# Patient Record
Sex: MALE | Race: WHITE | Employment: FULL TIME | URBAN - METROPOLITAN AREA
[De-identification: names, ages, dates, MRNs, and addresses within clinical notes are randomized per-mention and may not be internally consistent; named-entity substitution may affect disease eponyms.]

---

## 2017-11-02 ENCOUNTER — INITIAL CONSULT (OUTPATIENT)
Dept: SURGERY | Age: 47
End: 2017-11-02

## 2017-11-02 VITALS
RESPIRATION RATE: 12 BRPM | DIASTOLIC BLOOD PRESSURE: 64 MMHG | SYSTOLIC BLOOD PRESSURE: 118 MMHG | BODY MASS INDEX: 32.1 KG/M2 | HEART RATE: 72 BPM | TEMPERATURE: 98.3 F | WEIGHT: 237 LBS | HEIGHT: 72 IN

## 2017-11-02 DIAGNOSIS — Z93.3 STATUS POST HARTMANN PROCEDURE (HCC): Primary | ICD-10-CM

## 2017-11-02 DIAGNOSIS — K43.2 INCISIONAL HERNIA, WITHOUT OBSTRUCTION OR GANGRENE: ICD-10-CM

## 2017-11-02 PROCEDURE — 99213 OFFICE O/P EST LOW 20 MIN: CPT | Performed by: SURGERY

## 2017-11-02 ASSESSMENT — ENCOUNTER SYMPTOMS
ANAL BLEEDING: 0
TROUBLE SWALLOWING: 0
VOMITING: 0
BACK PAIN: 0
WHEEZING: 0
EYE PAIN: 0
EYE DISCHARGE: 0
ABDOMINAL DISTENTION: 0
CHOKING: 0
COLOR CHANGE: 0
ABDOMINAL PAIN: 0
SHORTNESS OF BREATH: 0
CHEST TIGHTNESS: 0

## 2017-11-02 NOTE — PROGRESS NOTES
daily      aspirin 81 MG tablet Take 81 mg by mouth daily      oxyCODONE-acetaminophen (PERCOCET) 5-325 MG per tablet Take 1 tablet by mouth every 4 hours as needed for Pain       No current facility-administered medications for this visit. Allergies   Allergen Reactions    Penicillins      swelling         Review of Systems   Constitutional: Negative for chills, fatigue, fever and unexpected weight change. HENT: Negative for nosebleeds and trouble swallowing. Eyes: Negative for pain and discharge. Respiratory: Negative for choking, chest tightness, shortness of breath and wheezing. Cardiovascular: Negative for chest pain, palpitations and leg swelling. Gastrointestinal: Negative for abdominal distention, abdominal pain, anal bleeding and vomiting. Genitourinary: Negative for difficulty urinating, dysuria, flank pain and urgency. Musculoskeletal: Negative for arthralgias, back pain and neck stiffness. Skin: Negative for color change and wound. Neurological: Negative for dizziness, tremors, seizures, syncope and headaches. Hematological: Negative for adenopathy. Does not bruise/bleed easily. Psychiatric/Behavioral: Negative for agitation, behavioral problems and confusion. The patient is not nervous/anxious. Vitals:    11/02/17 1454   BP: 118/64   Pulse: 72   Resp: 12   Temp: 98.3 °F (36.8 °C)           Physical Exam   Constitutional: He is oriented to person, place, and time. He appears well-developed and well-nourished. HENT:   Head: Normocephalic and atraumatic. Eyes: Conjunctivae are normal. Pupils are equal, round, and reactive to light. Neck: Normal range of motion. Cardiovascular: Normal rate and normal heart sounds. Pulmonary/Chest: Effort normal. No stridor. He has no wheezes. He has no rales. Abdominal: Soft. Bowel sounds are normal. He exhibits no mass. There is no tenderness. There is no rebound and no guarding.    Midline scar with multiple

## 2023-01-18 PROBLEM — G89.18 POST-OP PAIN: Status: ACTIVE | Noted: 2023-01-18

## 2023-01-18 PROBLEM — L50.9 URTICARIA: Status: ACTIVE | Noted: 2023-01-18

## 2023-01-18 PROBLEM — K57.20 DIVERTICULITIS OF COLON WITH PERFORATION: Status: ACTIVE | Noted: 2023-01-18

## 2023-01-18 PROBLEM — R05.8 PRODUCTIVE COUGH: Status: ACTIVE | Noted: 2023-01-18

## 2023-01-18 PROBLEM — N17.9 AKI (ACUTE KIDNEY INJURY) (CMS-HCC): Status: ACTIVE | Noted: 2023-01-18

## 2023-01-18 PROBLEM — Z79.4 TYPE 2 DIABETES MELLITUS WITHOUT COMPLICATION, WITH LONG-TERM CURRENT USE OF INSULIN (MULTI): Status: ACTIVE | Noted: 2023-01-18

## 2023-01-18 PROBLEM — H81.23 VESTIBULAR NEURONITIS OF BOTH EARS: Status: ACTIVE | Noted: 2023-01-18

## 2023-01-18 PROBLEM — E03.9 ACQUIRED HYPOTHYROIDISM: Status: ACTIVE | Noted: 2023-01-18

## 2023-01-18 PROBLEM — N20.1 LEFT URETERAL STONE: Status: ACTIVE | Noted: 2023-01-18

## 2023-01-18 PROBLEM — I10 ESSENTIAL HYPERTENSION: Status: ACTIVE | Noted: 2023-01-18

## 2023-01-18 PROBLEM — N13.30 HYDRONEPHROSIS: Status: ACTIVE | Noted: 2023-01-18

## 2023-01-18 PROBLEM — S37.10XA URETER INJURY: Status: ACTIVE | Noted: 2023-01-18

## 2023-01-18 PROBLEM — H66.90 ACUTE OTITIS MEDIA: Status: ACTIVE | Noted: 2023-01-18

## 2023-01-18 PROBLEM — E11.9 TYPE 2 DIABETES MELLITUS WITHOUT COMPLICATION, WITH LONG-TERM CURRENT USE OF INSULIN (MULTI): Status: ACTIVE | Noted: 2023-01-18

## 2023-01-18 PROBLEM — G47.33 OBSTRUCTIVE SLEEP APNEA ON CPAP: Status: ACTIVE | Noted: 2023-01-18

## 2023-01-18 PROBLEM — E78.2 MIXED HYPERLIPIDEMIA: Status: ACTIVE | Noted: 2023-01-18

## 2023-01-18 PROBLEM — J06.9 ACUTE URI: Status: ACTIVE | Noted: 2023-01-18

## 2023-01-18 PROBLEM — T81.40XA POST OP INFECTION: Status: ACTIVE | Noted: 2023-01-18

## 2023-01-18 PROBLEM — R10.9 LEFT FLANK PAIN: Status: ACTIVE | Noted: 2023-01-18

## 2023-01-18 PROBLEM — R07.89 OTHER CHEST PAIN: Status: ACTIVE | Noted: 2023-01-18

## 2023-01-18 PROBLEM — K43.9 VENTRAL HERNIA: Status: ACTIVE | Noted: 2023-01-18

## 2023-01-18 PROBLEM — R00.2 PALPITATIONS: Status: ACTIVE | Noted: 2023-01-18

## 2023-01-18 PROBLEM — K43.2 INCISIONAL HERNIA: Status: ACTIVE | Noted: 2023-01-18

## 2023-01-18 PROBLEM — H65.01 RIGHT ACUTE SEROUS OTITIS MEDIA: Status: ACTIVE | Noted: 2023-01-18

## 2023-01-18 RX ORDER — DULAGLUTIDE 1.5 MG/.5ML
1.5 INJECTION, SOLUTION SUBCUTANEOUS
COMMUNITY
End: 2023-03-06 | Stop reason: DRUGHIGH

## 2023-01-18 RX ORDER — ATORVASTATIN CALCIUM 20 MG/1
20 TABLET, FILM COATED ORAL DAILY
COMMUNITY
End: 2023-06-02 | Stop reason: SDUPTHER

## 2023-01-18 RX ORDER — CETIRIZINE HYDROCHLORIDE 10 MG/1
10 TABLET ORAL DAILY
COMMUNITY
End: 2023-03-06 | Stop reason: ALTCHOICE

## 2023-01-18 RX ORDER — LANCETS 33 GAUGE
EACH MISCELLANEOUS
COMMUNITY

## 2023-01-18 RX ORDER — METFORMIN HYDROCHLORIDE 1000 MG/1
1000 TABLET ORAL
COMMUNITY
End: 2023-08-22 | Stop reason: SDUPTHER

## 2023-01-18 RX ORDER — LEVOTHYROXINE SODIUM 150 UG/1
150 TABLET ORAL DAILY
COMMUNITY
End: 2023-06-02 | Stop reason: SDUPTHER

## 2023-01-18 RX ORDER — BLOOD SUGAR DIAGNOSTIC
STRIP MISCELLANEOUS
COMMUNITY

## 2023-01-18 RX ORDER — LISINOPRIL 20 MG/1
20 TABLET ORAL DAILY
COMMUNITY
End: 2023-06-02 | Stop reason: SDUPTHER

## 2023-03-04 ASSESSMENT — ENCOUNTER SYMPTOMS
NERVOUS/ANXIOUS: 0
HUNGER: 0
WEAKNESS: 0
WEIGHT LOSS: 0
DIZZINESS: 0
HEADACHES: 0
TREMORS: 0
BLURRED VISION: 0
SWEATS: 0
POLYPHAGIA: 0
VISUAL CHANGE: 0
SPEECH DIFFICULTY: 0
FATIGUE: 0
CONFUSION: 0
POLYDIPSIA: 0
SEIZURES: 0
BLACKOUTS: 0

## 2023-03-06 ENCOUNTER — OFFICE VISIT (OUTPATIENT)
Dept: PRIMARY CARE | Facility: CLINIC | Age: 53
End: 2023-03-06
Payer: COMMERCIAL

## 2023-03-06 VITALS
BODY MASS INDEX: 31.15 KG/M2 | HEART RATE: 64 BPM | OXYGEN SATURATION: 97 % | RESPIRATION RATE: 16 BRPM | DIASTOLIC BLOOD PRESSURE: 74 MMHG | SYSTOLIC BLOOD PRESSURE: 112 MMHG | WEIGHT: 230 LBS | TEMPERATURE: 97.2 F | HEIGHT: 72 IN

## 2023-03-06 DIAGNOSIS — I10 ESSENTIAL HYPERTENSION: ICD-10-CM

## 2023-03-06 DIAGNOSIS — E78.2 MIXED HYPERLIPIDEMIA: ICD-10-CM

## 2023-03-06 DIAGNOSIS — E03.9 ACQUIRED HYPOTHYROIDISM: ICD-10-CM

## 2023-03-06 DIAGNOSIS — Z12.5 SCREENING FOR PROSTATE CANCER: ICD-10-CM

## 2023-03-06 DIAGNOSIS — Z79.4 TYPE 2 DIABETES MELLITUS WITHOUT COMPLICATION, WITH LONG-TERM CURRENT USE OF INSULIN (MULTI): Primary | ICD-10-CM

## 2023-03-06 DIAGNOSIS — E11.9 TYPE 2 DIABETES MELLITUS WITHOUT COMPLICATION, WITH LONG-TERM CURRENT USE OF INSULIN (MULTI): Primary | ICD-10-CM

## 2023-03-06 PROCEDURE — 3078F DIAST BP <80 MM HG: CPT | Performed by: FAMILY MEDICINE

## 2023-03-06 PROCEDURE — 3074F SYST BP LT 130 MM HG: CPT | Performed by: FAMILY MEDICINE

## 2023-03-06 PROCEDURE — 99214 OFFICE O/P EST MOD 30 MIN: CPT | Performed by: FAMILY MEDICINE

## 2023-03-06 PROCEDURE — 1036F TOBACCO NON-USER: CPT | Performed by: FAMILY MEDICINE

## 2023-03-06 PROCEDURE — 4010F ACE/ARB THERAPY RXD/TAKEN: CPT | Performed by: FAMILY MEDICINE

## 2023-03-06 RX ORDER — DULAGLUTIDE 3 MG/.5ML
3 INJECTION, SOLUTION SUBCUTANEOUS
Qty: 6 ML | Refills: 1 | Status: SHIPPED
Start: 2023-03-06 | End: 2023-08-22 | Stop reason: SDUPTHER

## 2023-03-06 ASSESSMENT — ENCOUNTER SYMPTOMS
HEADACHES: 0
HUNGER: 0
POLYDIPSIA: 0
HYPERTENSION: 1
WEIGHT LOSS: 0
SWEATS: 0
DIZZINESS: 0
TREMORS: 0
BLURRED VISION: 0
POLYPHAGIA: 0
SEIZURES: 0
BLACKOUTS: 0
FATIGUE: 0
NERVOUS/ANXIOUS: 0
VISUAL CHANGE: 0
SPEECH DIFFICULTY: 0
CONFUSION: 0
WEAKNESS: 0

## 2023-03-06 NOTE — PROGRESS NOTES
Subjective   Patient ID: Jesus Cook is a 53 y.o. male who presents for Hyperlipidemia, Hypertension, Hypothyroidism, and Diabetes.    Diabetes  He has type 2 diabetes mellitus. No MedicAlert identification noted. The initial diagnosis of diabetes was made 12 years ago. Pertinent negatives for hypoglycemia include no confusion, dizziness, headaches, hunger, mood changes, nervousness/anxiousness, pallor, seizures, sleepiness, speech difficulty, sweats or tremors. Pertinent negatives for diabetes include no blurred vision, no chest pain, no fatigue, no foot paresthesias, no foot ulcerations, no polydipsia, no polyphagia, no polyuria, no visual change, no weakness and no weight loss. Hypoglycemia complications include nocturnal hypoglycemia. Pertinent negatives for hypoglycemia complications include no blackouts, no hospitalization, no required assistance and no required glucagon injection. Symptoms are stable. Pertinent negatives for diabetic complications include no CVA, heart disease, impotence, nephropathy, peripheral neuropathy, PVD or retinopathy. Risk factors for coronary artery disease include dyslipidemia, family history and hypertension. He is compliant with treatment all of the time. His weight is stable. He is following a generally healthy diet. When asked about meal planning, he reported none. He has not had a previous visit with a dietitian. He participates in exercise daily. He monitors blood glucose at home 1-2 x per day. He monitors urine at home <1 x per month. Blood glucose monitoring compliance is adequate. There is no change in his home blood glucose trend. His breakfast blood glucose is taken between 8-9 am. His breakfast blood glucose range is generally 110-130 mg/dl. His lunch blood glucose is taken between 1-2 pm. His lunch blood glucose range is generally  mg/dl. His dinner blood glucose is taken between 4-5 pm. His dinner blood glucose range is generally  mg/dl. His overall  blood glucose range is 110-130 mg/dl. He does not see a podiatrist.Eye exam is current.   Hyperlipidemia  Pertinent negatives include no chest pain.        Review of Systems   Constitutional:  Negative for fatigue and weight loss.   Eyes:  Negative for blurred vision.   Cardiovascular:  Negative for chest pain.   Endocrine: Negative for polydipsia, polyphagia and polyuria.   Genitourinary:  Negative for impotence.   Skin:  Negative for pallor.   Neurological:  Negative for dizziness, tremors, seizures, speech difficulty, weakness and headaches.   Psychiatric/Behavioral:  Negative for confusion. The patient is not nervous/anxious.        Objective   There were no vitals taken for this visit.    Physical Exam    Assessment/Plan   Problem List Items Addressed This Visit          Circulatory    Essential hypertension    Relevant Orders    Comprehensive Metabolic Panel    Hemoglobin A1C    Lipid Panel    Follow Up In [facility] - PCP       Endocrine/Metabolic    Acquired hypothyroidism    Relevant Orders    Thyroid Stimulating Hormone    Thyroxine, Free    Type 2 diabetes mellitus without complication, with long-term current use of insulin (CMS/[facility]) - Primary    Relevant Medications    dulaglutide (Trulicity) 3 mg/0.5 mL pen injector    Other Relevant Orders    Comprehensive Metabolic Panel    Hemoglobin A1C    Lipid Panel    Follow Up In [facility] - PCP       Other    Mixed hyperlipidemia    Relevant Orders    Comprehensive Metabolic Panel    Hemoglobin A1C    Lipid Panel    Follow Up In [facility] - PCP     Other Visit Diagnoses       Screening for prostate cancer        Relevant Orders    Prostate Spec.Ag,Screen

## 2023-06-02 DIAGNOSIS — E03.9 ACQUIRED HYPOTHYROIDISM: ICD-10-CM

## 2023-06-02 DIAGNOSIS — E78.2 MIXED HYPERLIPIDEMIA: ICD-10-CM

## 2023-06-02 DIAGNOSIS — I10 ESSENTIAL HYPERTENSION: ICD-10-CM

## 2023-06-02 RX ORDER — LISINOPRIL 20 MG/1
TABLET ORAL
Qty: 30 TABLET | Refills: 3 | Status: SHIPPED
Start: 2023-06-02 | End: 2023-08-22 | Stop reason: SDUPTHER

## 2023-06-02 RX ORDER — ATORVASTATIN CALCIUM 20 MG/1
TABLET, FILM COATED ORAL
Qty: 30 TABLET | Refills: 3 | Status: SHIPPED
Start: 2023-06-02 | End: 2023-08-22 | Stop reason: SDUPTHER

## 2023-06-02 RX ORDER — LEVOTHYROXINE SODIUM 150 UG/1
TABLET ORAL
Qty: 30 TABLET | Refills: 3 | Status: SHIPPED
Start: 2023-06-02 | End: 2023-08-22 | Stop reason: SDUPTHER

## 2023-06-02 NOTE — TELEPHONE ENCOUNTER
Rx Refill Request Telephone Encounter    Name:  Jesus Cook  :  [id number]  Medication Name:  levothyroxine (Synthroid, Levoxyl) 150 mcg atorvastatin (Lipitor) 20 mg lisinopril 20 mg   Specific Pharmacy location:  [facility]  Date of last appointment:  3/6  Date of next appointment:  7/10  Best number to reach patient:  [phone]

## 2023-07-10 ENCOUNTER — APPOINTMENT (OUTPATIENT)
Dept: PRIMARY CARE | Facility: CLINIC | Age: 53
End: 2023-07-10
Payer: COMMERCIAL

## 2023-07-31 DIAGNOSIS — R69 TRAVEL-RELATED ILLNESS: ICD-10-CM

## 2023-07-31 RX ORDER — CIPROFLOXACIN 500 MG/1
500 TABLET ORAL 2 TIMES DAILY
Qty: 6 TABLET | Refills: 0 | Status: SHIPPED | OUTPATIENT
Start: 2023-07-31 | End: 2023-08-03

## 2023-07-31 NOTE — TELEPHONE ENCOUNTER
Patient called stating he is going to [place] next week and is was told by the pharmacist that he should try to get a prescription for cipro before he leaves, just incase he gets sick and is unable to get an antibiotic.   Please advise   Grover christian

## 2023-07-31 NOTE — TELEPHONE ENCOUNTER
Date of last appointment:  3/6/2023   Date of next appointment:  8/22/2023   Best number to reach patient:  [phone]       I did pend a 5 day supply of Cipro for your approval or denial.

## 2023-08-19 LAB
ALANINE AMINOTRANSFERASE (SGPT) (U/L) IN SER/PLAS: 16 U/L
ALKALINE PHOSPHATASE (U/L) IN SER/PLAS: 70 U/L
ASPARTATE AMINOTRANSFERASE (SGOT) (U/L) IN SER/PLAS: 15 U/L
BILIRUBIN, TOTAL  (MG/DL) IN SER/PLAS: 1.4 MG/DL (ref 0–1.2)
CHOLESTEROL (MG/DL) IN SER/PLAS: 115 MG/DL
CREATININE (MG/DL) IN SER/PLAS: 0.93 MG/DL
GLUCOSE: 115 MG/DL
HDL-CHOLESTEROL (MG/DL) IN SER/PLAS: 38 MG/DL
HEMOGLOBIN A1C/HEMOGLOBIN TOTAL IN BLOOD: 6.2 %
LDL CHOLESTEROL: 58 MG/DL
POTASSIUM (MMOL/L) IN SER/PLAS: 4.6 MMOL/L
SODIUM (MMOL/L) IN SER/PLAS: 135 MMOL/L
THYROID STIMULATING HORMONE (MIU/L) IN SER/PLAS: 0.99 MIU/L
TRIGLYCERIDES  (MG/DL) IN SER/PLAS: 102 MG/DL
UREA NITROGEN (MG/DL) IN SER/PLAS: 11 MG/DL

## 2023-08-22 ENCOUNTER — OFFICE VISIT (OUTPATIENT)
Dept: PRIMARY CARE | Facility: CLINIC | Age: 53
End: 2023-08-22
Payer: COMMERCIAL

## 2023-08-22 VITALS
DIASTOLIC BLOOD PRESSURE: 70 MMHG | RESPIRATION RATE: 17 BRPM | SYSTOLIC BLOOD PRESSURE: 110 MMHG | HEART RATE: 81 BPM | TEMPERATURE: 97.3 F | OXYGEN SATURATION: 96 % | WEIGHT: 225.4 LBS | HEIGHT: 72 IN | BODY MASS INDEX: 30.53 KG/M2

## 2023-08-22 DIAGNOSIS — Z79.4 TYPE 2 DIABETES MELLITUS WITHOUT COMPLICATION, WITH LONG-TERM CURRENT USE OF INSULIN (MULTI): ICD-10-CM

## 2023-08-22 DIAGNOSIS — E78.2 MIXED HYPERLIPIDEMIA: ICD-10-CM

## 2023-08-22 DIAGNOSIS — E03.9 ACQUIRED HYPOTHYROIDISM: ICD-10-CM

## 2023-08-22 DIAGNOSIS — Z00.00 HEALTHCARE MAINTENANCE: ICD-10-CM

## 2023-08-22 DIAGNOSIS — I10 ESSENTIAL HYPERTENSION: ICD-10-CM

## 2023-08-22 DIAGNOSIS — E11.9 TYPE 2 DIABETES MELLITUS WITHOUT COMPLICATION, WITH LONG-TERM CURRENT USE OF INSULIN (MULTI): ICD-10-CM

## 2023-08-22 PROBLEM — N17.9 AKI (ACUTE KIDNEY INJURY) (CMS-HCC): Status: RESOLVED | Noted: 2023-01-18 | Resolved: 2023-08-22

## 2023-08-22 PROCEDURE — 1036F TOBACCO NON-USER: CPT | Performed by: FAMILY MEDICINE

## 2023-08-22 PROCEDURE — 3044F HG A1C LEVEL LT 7.0%: CPT | Performed by: FAMILY MEDICINE

## 2023-08-22 PROCEDURE — 3074F SYST BP LT 130 MM HG: CPT | Performed by: FAMILY MEDICINE

## 2023-08-22 PROCEDURE — 4010F ACE/ARB THERAPY RXD/TAKEN: CPT | Performed by: FAMILY MEDICINE

## 2023-08-22 PROCEDURE — 3078F DIAST BP <80 MM HG: CPT | Performed by: FAMILY MEDICINE

## 2023-08-22 PROCEDURE — 99396 PREV VISIT EST AGE 40-64: CPT | Performed by: FAMILY MEDICINE

## 2023-08-22 RX ORDER — METFORMIN HYDROCHLORIDE 1000 MG/1
1000 TABLET ORAL
Qty: 180 TABLET | Refills: 1 | Status: SHIPPED | OUTPATIENT
Start: 2023-08-22 | End: 2024-05-23 | Stop reason: SDUPTHER

## 2023-08-22 RX ORDER — LISINOPRIL 20 MG/1
TABLET ORAL
Qty: 90 TABLET | Refills: 1 | Status: SHIPPED | OUTPATIENT
Start: 2023-08-22 | End: 2024-05-23 | Stop reason: SDUPTHER

## 2023-08-22 RX ORDER — DULAGLUTIDE 3 MG/.5ML
3 INJECTION, SOLUTION SUBCUTANEOUS
Qty: 6 ML | Refills: 1 | Status: SHIPPED | OUTPATIENT
Start: 2023-08-22 | End: 2024-01-26

## 2023-08-22 RX ORDER — ATORVASTATIN CALCIUM 20 MG/1
TABLET, FILM COATED ORAL
Qty: 90 TABLET | Refills: 1 | Status: SHIPPED | OUTPATIENT
Start: 2023-08-22 | End: 2024-05-23 | Stop reason: SDUPTHER

## 2023-08-22 RX ORDER — LEVOTHYROXINE SODIUM 150 UG/1
TABLET ORAL
Qty: 90 TABLET | Refills: 1 | Status: SHIPPED | OUTPATIENT
Start: 2023-08-22 | End: 2024-05-23 | Stop reason: SDUPTHER

## 2023-08-22 ASSESSMENT — PATIENT HEALTH QUESTIONNAIRE - PHQ9
1. LITTLE INTEREST OR PLEASURE IN DOING THINGS: NOT AT ALL
2. FEELING DOWN, DEPRESSED OR HOPELESS: NOT AT ALL
SUM OF ALL RESPONSES TO PHQ9 QUESTIONS 1 AND 2: 0

## 2023-08-22 NOTE — ASSESSMENT & PLAN NOTE
Diabetes Mellitus type II, under good control.  1. Rx changes: None  2. Education: Reviewed ‘ABCs’ of diabetes management (respective goals in parentheses):  A1C (<7), blood pressure (<130/80), and cholesterol (LDL <100).  3. Compliance at present is estimated to be good. Efforts to improve compliance (if necessary) will be directed at dietary modifications: and increased exercise.  4. Follow up: 4 months

## 2023-08-22 NOTE — ASSESSMENT & PLAN NOTE
The nature of cardiac risk has been fully discussed with this patient. Discussed cardiovascular risk analysis and appropriate diet with the need for lifelong measures to reduce the risk. A regular exercise program is recommended to help achieve and maintain normal body weight, fitness and improve lipid balance. Patient education provided. They understand and agree with this course of treatment. They will return with new or worsening symptoms. Patient instructed to remain current with appropriate annual health maintenance.

## 2023-08-22 NOTE — PROGRESS NOTES
Chief Complaint   Patient presents with    Annual Exam    Follow-up     Diabetes, Hypertension, Hyperlipidemia, other chronic medical conditions and labs      He presents today for annual physical exam and follow up on Diabetes Mellitus, hypertension, hyperlipidemia    Current  diabetes related symptoms include: none. Patient denies foot ulcerations, hypoglycemia , nausea, paresthesia of the feet, polydipsia, polyuria, visual disturbances, vomiting, and weight loss.  Patient denies chest pain, claudication, dyspnea, exertional chest pressure/discomfort, irregular heart beat, lower extremity edema, orthopnea, palpitations, paroxysmal nocturnal dyspnea, and syncope. Evaluation to date has included: fasting blood sugar, fasting lipid panel, hemoglobin A1C, and microalbuminuria.  Home sugars: BGs are running  consistent with Hgb A1C.   Patient denies chest pain, claudication, dyspnea, exertional chest pressure/discomfort, irregular heart beat, lower extremity edema, orthopnea, palpitations, paroxysmal nocturnal dyspnea, and syncope.     Past Medical History:   Diagnosis Date    ARIELLE (acute kidney injury) (CMS/[facility]) 01/18/2023    Personal history of other diseases of the circulatory system 10/06/2016    History of hypertension     Patient Active Problem List    Diagnosis Date Noted    Healthcare maintenance 08/22/2023    Acquired hypothyroidism 01/18/2023    Diverticulitis of colon with perforation 01/18/2023    Essential hypertension 01/18/2023    Hydronephrosis 01/18/2023    Incisional hernia 01/18/2023    Left flank pain 01/18/2023    Left ureteral stone 01/18/2023    Mixed hyperlipidemia 01/18/2023    Obstructive sleep apnea on CPAP 01/18/2023    Other chest pain 01/18/2023    Post op infection 01/18/2023    Post-op pain 01/18/2023    Productive cough 01/18/2023    Type 2 diabetes mellitus without complication, with long-term current use of insulin (CMS/[facility]) 01/18/2023    Ureter injury 01/18/2023    Ventral hernia  01/18/2023    Vestibular neuronitis of both ears 01/18/2023    Acute otitis media 01/18/2023    Acute URI 01/18/2023    Palpitations 01/18/2023    Right acute serous otitis media 01/18/2023    Urticaria 01/18/2023     Past Surgical History:   Procedure Laterality Date    APPENDECTOMY  10/06/2016    Appendectomy    COLOSTOMY  10/06/2016    Colostomy    CYSTOSCOPY  10/06/2016    Diagnostic Cystoscopy    HERNIA REPAIR  10/06/2016    Hernia Repair    OTHER SURGICAL HISTORY  02/27/2020    Thyroidectomy    OTHER SURGICAL HISTORY  08/06/2020    Colonoscopy     No family history on file.    Social History     Socioeconomic History    Marital status:      Spouse name: None    Number of children: None    Years of education: None    Highest education level: None   Occupational History    None   Tobacco Use    Smoking status: Never    Smokeless tobacco: Never   Substance and Sexual Activity    Alcohol use: Never    Drug use: Never    Sexual activity: None   Other Topics Concern    None   Social History Narrative    None     Social Determinants of Health     Financial Resource Strain: Not on file   Food Insecurity: Not on file   Transportation Needs: Not on file   Physical Activity: Not on file   Stress: Not on file   Social Connections: Not on file   Intimate Partner Violence: Not on file   Housing Stability: Not on file     Current Outpatient Medications   Medication Sig Dispense Refill    lancets 33 gauge misc Test three times daily as directed      OneTouch Ultra Test strip Test three times daily      atorvastatin (Lipitor) 20 mg tablet Take 1 tablet (20 mg) by mouth once daily. 90 tablet 1    dulaglutide (Trulicity) 3 mg/0.5 mL pen injector Inject 3 mg under the skin every 7 days. 6 mL 1    levothyroxine (Synthroid, Levoxyl) 150 mcg tablet Take 1 tablet (150 mcg) by mouth once daily. 90 tablet 1    lisinopril 20 mg tablet For blood pressureTake 1 tablet (20 mg) by mouth once daily. For blood pressure 90 tablet 1     metFORMIN (Glucophage) 1,000 mg tablet Take 1 tablet (1,000 mg) by mouth 2 times a day with meals. 180 tablet 1     No current facility-administered medications for this visit.     Current Outpatient Medications on File Prior to Visit   Medication Sig Dispense Refill    lancets 33 gauge misc Test three times daily as directed      OneTouch Ultra Test strip Test three times daily      [DISCONTINUED] atorvastatin (Lipitor) 20 mg tablet Take 1 tablet (20 mg) by mouth once daily. 30 tablet 3    [DISCONTINUED] dulaglutide (Trulicity) 3 mg/0.5 mL pen injector Inject 3 mg under the skin every 7 days. 6 mL 1    [DISCONTINUED] levothyroxine (Synthroid, Levoxyl) 150 mcg tablet Take 1 tablet (150 mcg) by mouth once daily. 30 tablet 3    [DISCONTINUED] lisinopril 20 mg tablet For blood pressureTake 1 tablet (20 mg) by mouth once daily. For blood pressure 30 tablet 3    [DISCONTINUED] metFORMIN (Glucophage) 1,000 mg tablet Take 1 tablet (1,000 mg) by mouth 2 times a day with meals.       No current facility-administered medications on file prior to visit.     Allergies   Allergen Reactions    Penicillins Unknown          Review of Systems - General ROS: negative for - fatigue, fever, malaise, night sweats, sleep disturbance or weight loss  Psychological ROS: negative for - anxiety, concentration difficulties, depression, memory difficulties or sleep disturbances  ENT ROS: negative for - hearing change, nasal discharge, oral lesions, sinus pain, sore throat, tinnitus or vertigo  Allergy and Immunology ROS: negative for - hives, nasal congestion or seasonal allergies  Hematological and Lymphatic ROS: negative for - bruising, fatigue, night sweats or pallor  Endocrine ROS: negative for - hot flashes, malaise/lethargy, palpitations, polydipsia/polyuria, skin changes, temperature intolerance or unexpected weight changes  Respiratory ROS: negative for - cough, hemoptysis, pleuritic pain, shortness of breath or wheezing  Cardiovascular  ROS: no chest pain or dyspnea on exertion  Gastrointestinal ROS: no abdominal pain, change in bowel habits, or black or bloody stools  Genito-Urinary ROS: no dysuria, trouble voiding, or hematuria  Musculoskeletal ROS: negative for - joint pain, joint stiffness, joint swelling, muscle pain or muscular weakness  Neurological ROS: negative for - dizziness, gait disturbance, headaches, impaired coordination/balance, numbness/tingling, tremors or visual changes  Dermatological ROS: negative for - dry skin, lumps, pruritus or rash      Blood pressure 110/70, pulse 81, temperature 36.3 °C (97.3 °F), resp. rate 17, height 1.829 m (6'), weight 102 kg (225 lb 6.4 oz), SpO2 96 %.    Physical Examination: General appearance - alert, well appearing, and in no distress  Mental status - alert, oriented to person, place, and time  Eyes - pupils equal and reactive, extraocular eye movements intact  Ears - bilateral TM's and external ear canals normal  Mouth - mucous membranes moist, pharynx normal without lesions  Neck - supple, no significant adenopathy  Lymphatics - no palpable lymphadenopathy, no hepatosplenomegaly  Chest - clear to auscultation, no wheezes, rales or rhonchi, symmetric air entry  Heart - normal rate, regular rhythm, normal S1, S2, no murmurs, rubs, clicks or gallops  Abdomen - soft, nontender, nondistended, no masses or organomegaly  Neurological - alert, oriented, normal speech, no focal findings or movement disorder noted  Musculoskeletal - no joint tenderness, deformity or swelling  Extremities: peripheral pulses normal, no pedal edema, no clubbing or cyanosis.    Diabetic foot exam: Has full complement of pulses in both feet including dorsalis pedis and posterior tibial artery.  Has calluses and preulcerative calluses.  Sensation normal in 10 out of 10 monofilament testing on the right side and 10 out of 10 on the left side.  Normal proprioception and vibration sense.  Both feet are warm to touch.  No  ulceration.    Legacy Encounter on 05/27/2022   Component Date Value Ref Range Status    Glucose 05/27/2022 119 (H)  74 - 99 mg/dL Final    Sodium 05/27/2022 137  136 - 145 mmol/L Final    Potassium 05/27/2022 4.0  3.5 - 5.3 mmol/L Final    Chloride 05/27/2022 103  98 - 107 mmol/L Final    Bicarbonate 05/27/2022 26  21 - 32 mmol/L Final    Anion Gap 05/27/2022 12  10 - 20 mmol/L Final    Urea Nitrogen 05/27/2022 12  6 - 23 mg/dL Final    Creatinine 05/27/2022 1.42 (H)  0.50 - 1.30 mg/dL Final    GFR MALE 05/27/2022 59 (A)  >90 mL/min/1.73m2 Final    Comment:  CALCULATIONS OF ESTIMATED GFR ARE PERFORMED   USING THE 2021 CKD-EPI STUDY REFIT EQUATION   WITHOUT THE RACE VARIABLE FOR THE IDMS-TRACEABLE   CREATININE METHODS.    https://jasn.asnjournals.org/content/early/2021/09/22/ASN.4117526406      Calcium 05/27/2022 8.7  8.6 - 10.3 mg/dL Final    POCT Glucose 05/26/2022 136 (H)  74 - 99 mg/dL Final    Glucose 05/26/2022 119 (H)  74 - 99 mg/dL Final    Sodium 05/26/2022 136  136 - 145 mmol/L Final    Potassium 05/26/2022 4.2  3.5 - 5.3 mmol/L Final    Chloride 05/26/2022 100  98 - 107 mmol/L Final    Bicarbonate 05/26/2022 29  21 - 32 mmol/L Final    Anion Gap 05/26/2022 11  10 - 20 mmol/L Final    Urea Nitrogen 05/26/2022 16  6 - 23 mg/dL Final    Creatinine 05/26/2022 1.75 (H)  0.50 - 1.30 mg/dL Final    GFR MALE 05/26/2022 46 (A)  >90 mL/min/1.73m2 Final    Comment:  CALCULATIONS OF ESTIMATED GFR ARE PERFORMED   USING THE 2021 CKD-EPI STUDY REFIT EQUATION   WITHOUT THE RACE VARIABLE FOR THE IDMS-TRACEABLE   CREATININE METHODS.    https://jasn.asnjournals.org/content/early/2021/09/22/ASN.[email or web address]      Calcium 05/26/2022 8.8  8.6 - 10.3 mg/dL Final    Glucose 05/25/2022 164 (H)  74 - 99 mg/dL Final    Sodium 05/25/2022 132 (L)  136 - 145 mmol/L Final    Potassium 05/25/2022 4.0  3.5 - 5.3 mmol/L Final    Chloride 05/25/2022 99  98 - 107 mmol/L Final    Bicarbonate 05/25/2022 25  21 - 32 mmol/L Final    Anion Gap  05/25/2022 12  10 - 20 mmol/L Final    Urea Nitrogen 05/25/2022 17  6 - 23 mg/dL Final    Creatinine 05/25/2022 1.61 (H)  0.50 - 1.30 mg/dL Final    GFR MALE 05/25/2022 51 (A)  >90 mL/min/1.73m2 Final    Comment:  CALCULATIONS OF ESTIMATED GFR ARE PERFORMED   USING THE 2021 CKD-EPI STUDY REFIT EQUATION   WITHOUT THE RACE VARIABLE FOR THE IDMS-TRACEABLE   CREATININE METHODS.    https://jasn.asnjournals.org/content/early/2021/09/22/ASN.[email or web address]      Calcium 05/25/2022 9.5  8.6 - 10.3 mg/dL Final    Albumin 05/25/2022 4.4  3.4 - 5.0 g/dL Final    Alkaline Phosphatase 05/25/2022 55  33 - 120 U/L Final    Total Protein 05/25/2022 6.8  6.4 - 8.2 g/dL Final    AST 05/25/2022 19  9 - 39 U/L Final    Total Bilirubin 05/25/2022 0.9  0.0 - 1.2 mg/dL Final    ALT (SGPT) 05/25/2022 22  10 - 52 U/L Final    Comment:  Patients treated with Sulfasalazine may generate    falsely decreased results for ALT.      POCT Glucose 05/25/2022 109 (H)  74 - 99 mg/dL Final    Color, Urine 05/25/2022 YELLOW  STRAW,YELLOW Final    Appearance, Urine 05/25/2022 CLEAR  CLEAR Final    Specific Gravity, Urine 05/25/2022 1.026  1.005 - 1.035 Final    pH, Urine 05/25/2022 5.0  5.0 - 8.0 Final    Protein, Urine 05/25/2022 NEGATIVE  NEGATIVE mg/dL Final    Glucose, Urine 05/25/2022 >=500(3+) (A)  NEGATIVE mg/dL Final    Blood, Urine 05/25/2022 MODERATE(2+) (A)  NEGATIVE Final    Ketones, Urine 05/25/2022 5 (TRACE) (A)  NEGATIVE mg/dL Final    Bilirubin, Urine 05/25/2022 NEGATIVE  NEGATIVE Final    Urobilinogen, Urine 05/25/2022 <2.0  0.0 - 1.9 mg/dL Final    Nitrite, Urine 05/25/2022 NEGATIVE  NEGATIVE Final    Leukocyte Esterase, Urine 05/25/2022 NEGATIVE  NEGATIVE Final    POCT Glucose 05/25/2022 129 (H)  74 - 99 mg/dL Final    Magnesium 05/27/2022 2.20  1.60 - 2.40 mg/dL Final    Magnesium 05/26/2022 1.60  1.60 - 2.40 mg/dL Final    POCT Glucose 05/26/2022 116 (H)  74 - 99 mg/dL Final    POCT Glucose 05/26/2022 162 (H)  74 - 99 mg/dL Final    POCT  Glucose 05/25/2022 114 (H)  74 - 99 mg/dL Final    Glucose 05/25/2022 130 (H)  74 - 99 mg/dL Final    Sodium 05/25/2022 133 (L)  136 - 145 mmol/L Final    Potassium 05/25/2022 4.0  3.5 - 5.3 mmol/L Final    Chloride 05/25/2022 100  98 - 107 mmol/L Final    Bicarbonate 05/25/2022 27  21 - 32 mmol/L Final    Anion Gap 05/25/2022 10  10 - 20 mmol/L Final    Urea Nitrogen 05/25/2022 13  6 - 23 mg/dL Final    Creatinine 05/25/2022 1.56 (H)  0.50 - 1.30 mg/dL Final    GFR MALE 05/25/2022 53 (A)  >90 mL/min/1.73m2 Final    Comment:  CALCULATIONS OF ESTIMATED GFR ARE PERFORMED   USING THE 2021 CKD-EPI STUDY REFIT EQUATION   WITHOUT THE RACE VARIABLE FOR THE IDMS-TRACEABLE   CREATININE METHODS.    https://jasn.asnjournals.org/content/early/2021/09/22/ASN.[email or web address]      Calcium 05/25/2022 8.4 (L)  8.6 - 10.3 mg/dL Final    POCT Glucose 05/26/2022 148 (H)  74 - 99 mg/dL Final    WBC 05/25/2022 10.3  4.4 - 11.3 x10E9/L Final    nRBC 05/25/2022 0.0  0.0 - 0.0 /100 WBC Final    RBC 05/25/2022 4.50  4.50 - 5.90 x10E12/L Final    Hemoglobin 05/25/2022 12.6 (L)  13.5 - 17.5 g/dL Final    Hematocrit 05/25/2022 38.3 (L)  41.0 - 52.0 % Final    MCV 05/25/2022 85  80 - 100 fL Final    MCHC 05/25/2022 32.9  32.0 - 36.0 g/dL Final    Platelets 05/25/2022 251  150 - 450 x10E9/L Final    RDW 05/25/2022 13.4  11.5 - 14.5 % Final    WBC 05/26/2022 10.9  4.4 - 11.3 x10E9/L Final    nRBC 05/26/2022 0.0  0.0 - 0.0 /100 WBC Final    RBC 05/26/2022 4.81  4.50 - 5.90 x10E12/L Final    Hemoglobin 05/26/2022 13.6  13.5 - 17.5 g/dL Final    Hematocrit 05/26/2022 41.8  41.0 - 52.0 % Final    MCV 05/26/2022 87  80 - 100 fL Final    MCHC 05/26/2022 32.5  32.0 - 36.0 g/dL Final    Platelets 05/26/2022 291  150 - 450 x10E9/L Final    RDW 05/26/2022 13.6  11.5 - 14.5 % Final    WBC, Urine 05/25/2022 0-5  0 - 5 /HPF Final    RBC, Urine 05/25/2022 6-20 (A)  0 - 5 /HPF Final    Calcium Oxalate Crystals, Urine 05/25/2022 1+  /HPF Final    Glucose 05/26/2022  129 (H)  74 - 99 mg/dL Final    Sodium 05/26/2022 135 (L)  136 - 145 mmol/L Final    Potassium 05/26/2022 3.9  3.5 - 5.3 mmol/L Final    Chloride 05/26/2022 100  98 - 107 mmol/L Final    Bicarbonate 05/26/2022 26  21 - 32 mmol/L Final    Anion Gap 05/26/2022 13  10 - 20 mmol/L Final    Urea Nitrogen 05/26/2022 13  6 - 23 mg/dL Final    Creatinine 05/26/2022 1.64 (H)  0.50 - 1.30 mg/dL Final    GFR MALE 05/26/2022 50 (A)  >90 mL/min/1.73m2 Final    Comment:  CALCULATIONS OF ESTIMATED GFR ARE PERFORMED   USING THE 2021 CKD-EPI STUDY REFIT EQUATION   WITHOUT THE RACE VARIABLE FOR THE IDMS-TRACEABLE   CREATININE METHODS.    https://jasn.asnjournals.org/content/early/2021/09/22/ASN.[email or web address]      Calcium 05/26/2022 9.1  8.6 - 10.3 mg/dL Final    POCT Glucose 05/26/2022 118 (H)  74 - 99 mg/dL Final    WBC 05/25/2022 14.3 (H)  4.4 - 11.3 x10E9/L Final    nRBC 05/25/2022 0.0  0.0 - 0.0 /100 WBC Final    RBC 05/25/2022 5.02  4.50 - 5.90 x10E12/L Final    Hemoglobin 05/25/2022 13.9  13.5 - 17.5 g/dL Final    Hematocrit 05/25/2022 42.8  41.0 - 52.0 % Final    MCV 05/25/2022 85  80 - 100 fL Final    MCHC 05/25/2022 32.5  32.0 - 36.0 g/dL Final    Platelets 05/25/2022 306  150 - 450 x10E9/L Final    RDW 05/25/2022 13.4  11.5 - 14.5 % Final    Neutrophils % 05/25/2022 83.9  40.0 - 80.0 % Final    Immature Granulocytes %, Automated 05/25/2022 0.4  0.0 - 0.9 % Final    Comment:  Immature Granulocyte Count (IG) includes promyelocytes,    myelocytes and metamyelocytes but does not include bands.   Percent differential counts (%) should be interpreted in the   context of the absolute cell counts (cells/L).      Lymphocytes % 05/25/2022 7.7  13.0 - 44.0 % Final    Monocytes % 05/25/2022 6.7  2.0 - 10.0 % Final    Eosinophils % 05/25/2022 0.8  0.0 - 6.0 % Final    Basophils % 05/25/2022 0.5  0.0 - 2.0 % Final    Neutrophils Absolute 05/25/2022 11.96 (H)  1.20 - 7.70 x10E9/L Final    Lymphocytes Absolute 05/25/2022 1.10 (L)  1.20 - 4.80  "x10E9/L Final    Monocytes Absolute 05/25/2022 0.96  0.10 - 1.00 x10E9/L Final    Eosinophils Absolute 05/25/2022 0.11  0.00 - 0.70 x10E9/L Final    Basophils Absolute 05/25/2022 0.07  0.00 - 0.10 x10E9/L Final    POCT Glucose 05/27/2022 124 (H)  74 - 99 mg/dL Final    POCT Glucose 05/25/2022 136 (H)  74 - 99 mg/dL Final    RN/MD NOTIFIED    POCT Glucose 05/25/2022 105 (H)  74 - 99 mg/dL Final       Problem List Items Addressed This Visit       Acquired hypothyroidism    Relevant Medications    levothyroxine (Synthroid, Levoxyl) 150 mcg tablet    Other Relevant Orders    Thyroid Stimulating Hormone    Thyroxine, Free    Follow Up In [facility] - PCP - Established    Essential hypertension    Current Assessment & Plan     Dietary sodium restriction.  Regular aerobic exercise program is recommended to help achieve and maintain normal body weight, fitness and improve lipid balance. .  Dietary changes: Increase soluble fiber  Plant sterols 2grams per day (e.g. Benecol)  Reduce saturated fat, \"trans\" monounsaturated fatty acids, and cholesterol         Relevant Medications    lisinopril 20 mg tablet    Other Relevant Orders    CBC and Auto Differential    Comprehensive Metabolic Panel    Hemoglobin A1C    Lipid Panel    Follow Up In [facility] - PCP - Established    Mixed hyperlipidemia    Current Assessment & Plan     The nature of cardiac risk has been fully discussed with this patient. Discussed cardiovascular risk analysis and appropriate diet with the need for lifelong measures to reduce the risk. A regular exercise program is recommended to help achieve and maintain normal body weight, fitness and improve lipid balance. Patient education provided. They understand and agree with this course of treatment. They will return with new or worsening symptoms. Patient instructed to remain current with appropriate annual health maintenance.          Relevant Medications    atorvastatin (Lipitor) 20 mg " tablet    Other Relevant Orders    CBC and Auto Differential    Comprehensive Metabolic Panel    Hemoglobin A1C    Lipid Panel    Follow Up In [facility] - PCP - Established    Type 2 diabetes mellitus without complication, with long-term current use of insulin (CMS/[facility])    Current Assessment & Plan     Diabetes Mellitus type II, under good control.  1. Rx changes: None  2. Education: Reviewed ‘ABCs’ of diabetes management (respective goals in parentheses):  A1C (<7), blood pressure (<130/80), and cholesterol (LDL <100).  3. Compliance at present is estimated to be good. Efforts to improve compliance (if necessary) will be directed at dietary modifications: and increased exercise.  4. Follow up: 4 months           Relevant Medications    dulaglutide (Trulicity) 3 mg/0.5 mL pen injector    metFORMIN (Glucophage) 1,000 mg tablet    Other Relevant Orders    CBC and Auto Differential    Comprehensive Metabolic Panel    Hemoglobin A1C    Lipid Panel    Follow Up In [facility] - PCP - Established    Healthcare maintenance     Patient to keep food diary and log of blood sugars and bring to next office visit. Patient encouraged to increase activity level gradually and encouraged weight loss. Strongly encouraged to maintain blood sugar at levels as close to normal as possible thus preventing or delaying complications (regular medical care is important for this). Encouraged to follow a balanced meal plan. Eat consistent and moderate amounts of food at regular times. Nuts and peanut butter are a good choice for a snack. Advised patient to not skip meals. Recommended that patient: Eat plenty of vegetables and fiber, limited amounts of fat, moderate amounts of protein and low-fat dairy products, and carefully limit foods containing high concentrated sugar. Keep a record of your food intake. We will review at the next visit. Educated patient about exercise. Exercise lowers blood glucose levels. Regular  exercise (eg, 30-60 minutes of walking every day) can help keep blood glucose in better control. Exercise increases insulin sensitivity and helps an individual lose weight. It can also help overall health.         Scribe Attestation  By signing my name below, I, Gallo Toledo   attest that this documentation has been prepared under the direction and in the presence of Damion Sutton MD.

## 2023-08-22 NOTE — ASSESSMENT & PLAN NOTE
"Dietary sodium restriction.  Regular aerobic exercise program is recommended to help achieve and maintain normal body weight, fitness and improve lipid balance. .  Dietary changes: Increase soluble fiber  Plant sterols 2grams per day (e.g. Benecol)  Reduce saturated fat, \"trans\" monounsaturated fatty acids, and cholesterol  "

## 2023-12-22 ENCOUNTER — APPOINTMENT (OUTPATIENT)
Dept: PRIMARY CARE | Facility: CLINIC | Age: 53
End: 2023-12-22
Payer: COMMERCIAL

## 2024-01-26 DIAGNOSIS — Z79.4 TYPE 2 DIABETES MELLITUS WITHOUT COMPLICATION, WITH LONG-TERM CURRENT USE OF INSULIN (MULTI): ICD-10-CM

## 2024-01-26 DIAGNOSIS — E11.9 TYPE 2 DIABETES MELLITUS WITHOUT COMPLICATION, WITH LONG-TERM CURRENT USE OF INSULIN (MULTI): ICD-10-CM

## 2024-01-26 RX ORDER — DULAGLUTIDE 3 MG/.5ML
3 INJECTION, SOLUTION SUBCUTANEOUS
Qty: 2 ML | Refills: 0 | Status: SHIPPED | OUTPATIENT
Start: 2024-01-26 | End: 2024-05-23 | Stop reason: SDUPTHER

## 2024-01-26 NOTE — TELEPHONE ENCOUNTER
Rx Refill Request Telephone Encounter    Name:  Jesus Hernandezler  :  [id number]  Medication Name:  trulicty 3 mg   Specific Pharmacy location:  [facility]   Date of last appointment:  23  Date of next appointment:  24  Best number to reach patient:  [phone]

## 2024-01-29 ENCOUNTER — TELEPHONE (OUTPATIENT)
Dept: PRIMARY CARE | Facility: CLINIC | Age: 54
End: 2024-01-29
Payer: COMMERCIAL

## 2024-01-29 NOTE — TELEPHONE ENCOUNTER
LMOM TO RESCHEDULE 4 month APPOINTMENT THAT PATIENT CANCELLED FOR 1/31. IF/WHEN PATIENT RETURNS CALL, PLEASE SCHEDULE IN NEXT AVAILABLE OPENING THAT PROVIDER HAS THAT IS CONVENIENT FOR PATIENT.

## 2024-01-31 ENCOUNTER — APPOINTMENT (OUTPATIENT)
Dept: PRIMARY CARE | Facility: CLINIC | Age: 54
End: 2024-01-31
Payer: COMMERCIAL

## 2024-02-14 ENCOUNTER — TELEPHONE (OUTPATIENT)
Dept: PRIMARY CARE | Facility: CLINIC | Age: 54
End: 2024-02-14
Payer: COMMERCIAL

## 2024-02-14 DIAGNOSIS — I10 ESSENTIAL HYPERTENSION: ICD-10-CM

## 2024-02-14 RX ORDER — LISINOPRIL 20 MG/1
TABLET ORAL
Qty: 30 TABLET | Refills: 0 | OUTPATIENT
Start: 2024-02-14

## 2024-02-14 NOTE — TELEPHONE ENCOUNTER
Patient refusing to schedule appointment at this time, will go through Doctor on demand through [facility] pharmacy.

## 2024-02-14 NOTE — TELEPHONE ENCOUNTER
Rx Refill Request Telephone Encounter  30 day fill pended   Name:  Jesus Cook  :  [id number]  Medication Name:  lisinopril 20 mg   Specific Pharmacy location:  [facility] [address] [place] [address]  Date of last appointment:  2023  Date of next appointment:  NA -- canceled 23 appt, and 23 appt  Best number to reach patient:  [phone]

## 2024-05-08 ENCOUNTER — TELEPHONE (OUTPATIENT)
Dept: PRIMARY CARE | Facility: CLINIC | Age: 54
End: 2024-05-08

## 2024-05-08 DIAGNOSIS — E11.9 TYPE 2 DIABETES MELLITUS WITHOUT COMPLICATION, WITH LONG-TERM CURRENT USE OF INSULIN (MULTI): ICD-10-CM

## 2024-05-08 DIAGNOSIS — E03.9 ACQUIRED HYPOTHYROIDISM: ICD-10-CM

## 2024-05-08 DIAGNOSIS — I10 ESSENTIAL HYPERTENSION: ICD-10-CM

## 2024-05-08 DIAGNOSIS — Z79.4 TYPE 2 DIABETES MELLITUS WITHOUT COMPLICATION, WITH LONG-TERM CURRENT USE OF INSULIN (MULTI): ICD-10-CM

## 2024-05-08 DIAGNOSIS — E78.2 MIXED HYPERLIPIDEMIA: ICD-10-CM

## 2024-05-08 LAB
ALANINE AMINOTRANSFERASE (SGPT) (U/L) IN SER/PLAS: 22 U/L
ALKALINE PHOSPHATASE (U/L) IN SER/PLAS: 79 U/L
ASPARTATE AMINOTRANSFERASE (SGOT) (U/L) IN SER/PLAS: 25 U/L
BILIRUBIN, TOTAL  (MG/DL) IN SER/PLAS: 1.2 MG/DL (ref 0–1.2)
CHOLESTEROL (MG/DL) IN SER/PLAS: 137 MG/DL
CREATININE (MG/DL) IN SER/PLAS: 1.03 MG/DL
GLUCOSE: 98 MG/DL
HDL-CHOLESTEROL (MG/DL) IN SER/PLAS: 40 MG/DL
HEMATOCRIT (%) IN BLOOD BY AUTOMATED COUNT: 45.4 % (ref 41–52)
HEMOGLOBIN (G/DL) IN BLOOD: 14.8 G/DL (ref 13.5–17.5)
HEMOGLOBIN A1C/HEMOGLOBIN TOTAL IN BLOOD: 6.8 %
LDL CHOLESTEROL: 71 MG/DL
LEUKOCYTES (10*3/UL) IN BLOOD BY AUTOMATED COUNT: 8.9 X10*3/UL (ref 4.4–11.3)
PLATELETS (10*3/UL) IN BLOOD AUTOMATED COUNT: 354 X10*3/UL (ref 150–450)
POTASSIUM (MMOL/L) IN SER/PLAS: 4.8 MMOL/L
SODIUM (MMOL/L) IN SER/PLAS: 136 MMOL/L
THYROID STIMULATING HORMONE (MIU/L) IN SER/PLAS: 1.28 MIU/L
TRIGLYCERIDES  (MG/DL) IN SER/PLAS: 152 MG/DL
UREA NITROGEN (MG/DL) IN SER/PLAS: 11 MG/DL

## 2024-05-08 RX ORDER — ATORVASTATIN CALCIUM 20 MG/1
TABLET, FILM COATED ORAL
Qty: 90 TABLET | Refills: 1 | OUTPATIENT
Start: 2024-05-08

## 2024-05-08 RX ORDER — METFORMIN HYDROCHLORIDE 1000 MG/1
1000 TABLET ORAL
Qty: 180 TABLET | Refills: 1 | OUTPATIENT
Start: 2024-05-08

## 2024-05-08 RX ORDER — DULAGLUTIDE 3 MG/.5ML
3 INJECTION, SOLUTION SUBCUTANEOUS
Qty: 2 ML | Refills: 0 | OUTPATIENT
Start: 2024-05-08

## 2024-05-08 RX ORDER — LEVOTHYROXINE SODIUM 150 UG/1
TABLET ORAL
Qty: 90 TABLET | Refills: 1 | OUTPATIENT
Start: 2024-05-08

## 2024-05-08 RX ORDER — LISINOPRIL 20 MG/1
TABLET ORAL
Qty: 90 TABLET | Refills: 1 | OUTPATIENT
Start: 2024-05-08

## 2024-05-08 NOTE — TELEPHONE ENCOUNTER
Medications last filled by an Dulce Potts--- Please advise if willing to fill. Medications pended

## 2024-05-08 NOTE — TELEPHONE ENCOUNTER
Rx Refill Request Telephone Encounter    Name:  Jesus Cook  :  [id number]  Medication Name:    atorvastatin (Lipitor) 20 mg tablet   dulaglutide (Trulicity) 3 mg/0.5 mL pen injector   levothyroxine (Synthroid, Levoxyl) 150 mcg tablet   lisinopril 20 mg tablet   metFORMIN (Glucophage) 1,000 mg tablet     Specific Pharmacy location:  [id number]     Date of last appointment:  23  Date of next appointment:  24    Best number to reach patient:  [phone]

## 2024-05-09 ENCOUNTER — APPOINTMENT (OUTPATIENT)
Dept: PRIMARY CARE | Facility: CLINIC | Age: 54
End: 2024-05-09
Payer: COMMERCIAL

## 2024-05-23 ENCOUNTER — OFFICE VISIT (OUTPATIENT)
Dept: PRIMARY CARE | Facility: CLINIC | Age: 54
End: 2024-05-23
Payer: COMMERCIAL

## 2024-05-23 VITALS
RESPIRATION RATE: 18 BRPM | WEIGHT: 217 LBS | SYSTOLIC BLOOD PRESSURE: 108 MMHG | HEIGHT: 72 IN | OXYGEN SATURATION: 99 % | TEMPERATURE: 96.9 F | BODY MASS INDEX: 29.39 KG/M2 | HEART RATE: 74 BPM | DIASTOLIC BLOOD PRESSURE: 64 MMHG

## 2024-05-23 DIAGNOSIS — E03.9 ACQUIRED HYPOTHYROIDISM: ICD-10-CM

## 2024-05-23 DIAGNOSIS — E78.2 MIXED HYPERLIPIDEMIA: ICD-10-CM

## 2024-05-23 DIAGNOSIS — Z12.5 ENCOUNTER FOR SCREENING FOR MALIGNANT NEOPLASM OF PROSTATE: ICD-10-CM

## 2024-05-23 DIAGNOSIS — E11.9 TYPE 2 DIABETES MELLITUS WITHOUT COMPLICATION, WITH LONG-TERM CURRENT USE OF INSULIN (MULTI): Primary | ICD-10-CM

## 2024-05-23 DIAGNOSIS — Z79.4 TYPE 2 DIABETES MELLITUS WITHOUT COMPLICATION, WITH LONG-TERM CURRENT USE OF INSULIN (MULTI): Primary | ICD-10-CM

## 2024-05-23 DIAGNOSIS — I10 ESSENTIAL HYPERTENSION: ICD-10-CM

## 2024-05-23 PROCEDURE — 3044F HG A1C LEVEL LT 7.0%: CPT | Performed by: FAMILY MEDICINE

## 2024-05-23 PROCEDURE — 1036F TOBACCO NON-USER: CPT | Performed by: FAMILY MEDICINE

## 2024-05-23 PROCEDURE — 3078F DIAST BP <80 MM HG: CPT | Performed by: FAMILY MEDICINE

## 2024-05-23 PROCEDURE — 3074F SYST BP LT 130 MM HG: CPT | Performed by: FAMILY MEDICINE

## 2024-05-23 PROCEDURE — 4010F ACE/ARB THERAPY RXD/TAKEN: CPT | Performed by: FAMILY MEDICINE

## 2024-05-23 PROCEDURE — 99214 OFFICE O/P EST MOD 30 MIN: CPT | Performed by: FAMILY MEDICINE

## 2024-05-23 RX ORDER — LEVOTHYROXINE SODIUM 150 UG/1
TABLET ORAL
Qty: 90 TABLET | Refills: 1 | Status: SHIPPED | OUTPATIENT
Start: 2024-05-23

## 2024-05-23 RX ORDER — METFORMIN HYDROCHLORIDE 1000 MG/1
1000 TABLET ORAL
Qty: 180 TABLET | Refills: 1 | Status: SHIPPED | OUTPATIENT
Start: 2024-05-23

## 2024-05-23 RX ORDER — LISINOPRIL 20 MG/1
TABLET ORAL
Qty: 90 TABLET | Refills: 1 | Status: SHIPPED | OUTPATIENT
Start: 2024-05-23

## 2024-05-23 RX ORDER — ATORVASTATIN CALCIUM 20 MG/1
TABLET, FILM COATED ORAL
Qty: 90 TABLET | Refills: 1 | Status: SHIPPED | OUTPATIENT
Start: 2024-05-23

## 2024-05-23 RX ORDER — DULAGLUTIDE 3 MG/.5ML
3 INJECTION, SOLUTION SUBCUTANEOUS
Qty: 6 ML | Refills: 1 | Status: SHIPPED | OUTPATIENT
Start: 2024-05-23

## 2024-05-23 ASSESSMENT — ENCOUNTER SYMPTOMS
NUMBNESS: 0
WEIGHT LOSS: 0
SHORTNESS OF BREATH: 0
FREQUENCY: 0
COUGH: 0
HEMATURIA: 0
WEAKNESS: 0
CONSTIPATION: 0
POLYDIPSIA: 0
PALPITATIONS: 0
SORE THROAT: 0
FATIGUE: 0
WHEEZING: 0
RHINORRHEA: 0
FEVER: 0
HEADACHES: 0
BLURRED VISION: 0
VISUAL CHANGE: 0
DIARRHEA: 0
DIZZINESS: 0
TREMORS: 0
CHILLS: 0
POLYPHAGIA: 0
DYSURIA: 0
VOMITING: 0
ABDOMINAL PAIN: 0

## 2024-05-23 NOTE — ASSESSMENT & PLAN NOTE
Diabetes Mellitus type II, under good control.  1. Rx changes: None  2. Education: Reviewed ‘ABCs’ of diabetes management (respective goals in parentheses):  A1C (<7), blood pressure (<130/80), and cholesterol (LDL <100).  3. Compliance at present is estimated to be good. Efforts to improve compliance (if necessary) will be directed at dietary modifications: and increased exercise.  4. Follow up: 5 months

## 2024-05-23 NOTE — PROGRESS NOTES
Subjective   Patient ID: Jesus Cook is a 54 y.o. male who presents for Follow-up (Diabetes, Hypertension, Hyperlipidemia, Hypothyroidism and labs).    Patient is here for follow-up on hypertension and hyperlipidemia. He has no chest pain, dyspnea, exertional chest pressure/discomfort, near-syncope, orthopnea, palpitations, paroxysmal nocturnal dyspnea, and syncope. Taking his medication regularly with no side effects.    He presents for follow up of hypothyroidism. Current symptoms: none. He has no change in energy level, diarrhea, heat / cold intolerance, nervousness, palpitations, or weight changes.     Diabetes  He presents for his follow-up diabetic visit. He has type 2 diabetes mellitus. His disease course has been worsening. There are no hypoglycemic associated symptoms. Pertinent negatives for hypoglycemia include no dizziness, headaches or tremors. Pertinent negatives for diabetes include no blurred vision, no chest pain, no fatigue, no foot paresthesias, no foot ulcerations, no polydipsia, no polyphagia, no polyuria, no visual change, no weakness and no weight loss. Risk factors for coronary artery disease include diabetes mellitus, dyslipidemia, hypertension and male sex.        Review of Systems   Constitutional:  Negative for chills, fatigue, fever and weight loss.   HENT:  Negative for congestion, ear pain, nosebleeds, rhinorrhea and sore throat.    Eyes:  Negative for blurred vision.   Respiratory:  Negative for cough, shortness of breath and wheezing.    Cardiovascular:  Negative for chest pain, palpitations and leg swelling.   Gastrointestinal:  Negative for abdominal pain, constipation, diarrhea and vomiting.   Endocrine: Negative for polydipsia, polyphagia and polyuria.   Genitourinary:  Negative for dysuria, frequency and hematuria.   Neurological:  Negative for dizziness, tremors, weakness, numbness and headaches.       Objective   /64   Pulse 74   Temp 36.1 °C (96.9 °F)   Resp 18    Ht 1.829 m (6')   Wt 98.4 kg (217 lb)   SpO2 99%   BMI 29.43 kg/m²     Physical Exam  Constitutional:       General: He is not in acute distress.     Appearance: Normal appearance.   HENT:      Head: Normocephalic and atraumatic.      Mouth/Throat:      Mouth: Mucous membranes are moist.      Pharynx: Oropharynx is clear. No oropharyngeal exudate or posterior oropharyngeal erythema.   Eyes:      General: No scleral icterus.     Extraocular Movements: Extraocular movements intact.      Pupils: Pupils are equal, round, and reactive to light.   Cardiovascular:      Rate and Rhythm: Normal rate and regular rhythm.      Pulses: Normal pulses.      Heart sounds: No murmur heard.     No friction rub. No gallop.   Pulmonary:      Effort: Pulmonary effort is normal.      Breath sounds: No wheezing, rhonchi or rales.   Skin:     General: Skin is warm.      Coloration: Skin is not jaundiced or pale.      Findings: No erythema or rash.   Neurological:      General: No focal deficit present.      Mental Status: He is alert and oriented to person, place, and time.      Cranial Nerves: No cranial nerve deficit.      Sensory: No sensory deficit.      Coordination: Coordination normal.      Gait: Gait normal.         Abstract on 05/09/2024   Component Date Value Ref Range Status    Hemoglobin 05/08/2024 14.8  13.5 - 17.5 g/dL Final    Hematocrit 05/08/2024 45.4  41.0 - 52.0 % Final    Platelets 05/08/2024 354  150 - 450 x10*3/uL Final    WBC 05/08/2024 8.9  4.4 - 11.3 x10*3/uL Final    Glucose 05/08/2024 98  mg/dL Final    Urea Nitrogen 05/08/2024 11  mg/dL Final    Creatinine 05/08/2024 1.03  mg/dL Final    Potassium 05/08/2024 4.8  mmol/L Final    Sodium 05/08/2024 136  mmol/L Final    Triglycerides 05/08/2024 152  mg/dL Final    Cholesterol 05/08/2024 137  mg/dL Final    HDL-Cholesterol 05/08/2024 40.0  mg/dL Final    LDL Cholesterol 05/08/2024 71  mg/dL Final    Alkaline Phosphatase 05/08/2024 79  U/L Final    ALT  05/08/2024 22  U/L Final    AST 05/08/2024 25  U/L Final    Bilirubin, Total 05/08/2024 1.2  0.0 - 1.2 mg/dL Final    Hemoglobin A1C 05/08/2024 6.8  % Final    Thyroid Stimulating Hormone 05/08/2024 1.28  mIU/L Final     Assessment/Plan   Problem List Items Addressed This Visit       Acquired hypothyroidism     Well-controlled, continue current medications and management.         Relevant Medications    levothyroxine (Synthroid, Levoxyl) 150 mcg tablet    Other Relevant Orders    Follow Up In [facility] - PCP - Established    Thyroid Stimulating Hormone    Thyroxine, Free    Essential hypertension     Well-controlled, continue current medications and management.         Relevant Medications    lisinopril 20 mg tablet    Other Relevant Orders    Follow Up In [facility] - PCP - Established    CBC and Auto Differential    Comprehensive Metabolic Panel    Hemoglobin A1C    Lipid Panel    Mixed hyperlipidemia     The nature of cardiac risk has been fully discussed with this patient. Discussed cardiovascular risk analysis and appropriate diet with the need for lifelong measures to reduce the risk. A regular exercise program is recommended to help achieve and maintain normal body weight, fitness and improve lipid balance. Patient education provided. They understand and agree with this course of treatment. They will return with new or worsening symptoms. Patient instructed to remain current with appropriate annual health maintenance.          Relevant Medications    atorvastatin (Lipitor) 20 mg tablet    Other Relevant Orders    Follow Up In [facility] - PCP - Established    CBC and Auto Differential    Comprehensive Metabolic Panel    Hemoglobin A1C    Lipid Panel    Type 2 diabetes mellitus without complication, with long-term current use of insulin (Multi) - Primary     Diabetes Mellitus type II, under good control.  1. Rx changes: None  2. Education: Reviewed ‘ABCs’ of diabetes  management (respective goals in parentheses):  A1C (<7), blood pressure (<130/80), and cholesterol (LDL <100).  3. Compliance at present is estimated to be good. Efforts to improve compliance (if necessary) will be directed at dietary modifications: and increased exercise.  4. Follow up: 5 months         Relevant Medications    metFORMIN (Glucophage) 1,000 mg tablet    dulaglutide (Trulicity) 3 mg/0.5 mL pen injector    Other Relevant Orders    Follow Up In [facility] - PCP - Established    CBC and Auto Differential    Comprehensive Metabolic Panel    Hemoglobin A1C    Lipid Panel     Other Visit Diagnoses       Encounter for screening for malignant neoplasm of prostate        Relevant Orders    Prostate Specific Antigen, Screen          Scribe Attestation  By signing my name below, IShorty Scribe   attest that this documentation has been prepared under the direction and in the presence of Damion Sutton MD.

## 2024-08-27 ENCOUNTER — APPOINTMENT (OUTPATIENT)
Dept: PRIMARY CARE | Facility: CLINIC | Age: 54
End: 2024-08-27
Payer: COMMERCIAL

## 2024-10-23 ENCOUNTER — APPOINTMENT (OUTPATIENT)
Dept: PRIMARY CARE | Facility: CLINIC | Age: 54
End: 2024-10-23
Payer: COMMERCIAL

## 2024-11-09 DIAGNOSIS — I10 ESSENTIAL HYPERTENSION: ICD-10-CM

## 2024-11-09 DIAGNOSIS — E03.9 ACQUIRED HYPOTHYROIDISM: ICD-10-CM

## 2024-11-09 RX ORDER — LISINOPRIL 20 MG/1
TABLET ORAL
Qty: 90 TABLET | Refills: 0 | OUTPATIENT
Start: 2024-11-09

## 2024-11-09 RX ORDER — LEVOTHYROXINE SODIUM 150 UG/1
TABLET ORAL
Qty: 90 TABLET | Refills: 0 | OUTPATIENT
Start: 2024-11-09

## 2024-11-11 RX ORDER — LISINOPRIL 20 MG/1
TABLET ORAL
Qty: 30 TABLET | Refills: 0 | Status: SHIPPED | OUTPATIENT
Start: 2024-11-11

## 2024-11-11 RX ORDER — LEVOTHYROXINE SODIUM 150 UG/1
TABLET ORAL
Qty: 30 TABLET | Refills: 0 | Status: SHIPPED | OUTPATIENT
Start: 2024-11-11

## 2024-12-02 LAB
ALANINE AMINOTRANSFERASE (SGPT) (U/L) IN SER/PLAS: 17 U/L
ALKALINE PHOSPHATASE (U/L) IN SER/PLAS: 76 U/L
ASPARTATE AMINOTRANSFERASE (SGOT) (U/L) IN SER/PLAS: 15 U/L
BILIRUBIN, TOTAL  (MG/DL) IN SER/PLAS: 1 MG/DL (ref 0–1.2)
CHOLESTEROL (MG/DL) IN SER/PLAS: 116 MG/DL
CREATININE (MG/DL) IN SER/PLAS: 0.94 MG/DL
GLUCOSE: 107 MG/DL
HDL-CHOLESTEROL (MG/DL) IN SER/PLAS: 39 MG/DL
HEMATOCRIT (%) IN BLOOD BY AUTOMATED COUNT: 43.9 % (ref 41–52)
HEMOGLOBIN (G/DL) IN BLOOD: 14.4 G/DL (ref 13.5–17.5)
HEMOGLOBIN A1C/HEMOGLOBIN TOTAL IN BLOOD: 6.4 %
LDL CHOLESTEROL: 57 MG/DL
LEUKOCYTES (10*3/UL) IN BLOOD BY AUTOMATED COUNT: 8.3 X10*3/UL (ref 4.4–11.3)
PLATELETS (10*3/UL) IN BLOOD AUTOMATED COUNT: 352 X10*3/UL (ref 150–450)
POTASSIUM (MMOL/L) IN SER/PLAS: 4.6 MMOL/L
PROSTATE SPECIFIC AG (NG/ML) IN SER/PLAS EXTERNAL: 0.26 NG/ML
SODIUM (MMOL/L) IN SER/PLAS: 138 MMOL/L
THYROID STIMULATING HORMONE (MIU/L) IN SER/PLAS: 0.37 MIU/L
TRIGLYCERIDES  (MG/DL) IN SER/PLAS: 113 MG/DL
UREA NITROGEN (MG/DL) IN SER/PLAS: 10 MG/DL

## 2024-12-05 ENCOUNTER — APPOINTMENT (OUTPATIENT)
Dept: PRIMARY CARE | Facility: CLINIC | Age: 54
End: 2024-12-05
Payer: COMMERCIAL

## 2024-12-05 VITALS
OXYGEN SATURATION: 98 % | HEART RATE: 65 BPM | WEIGHT: 225 LBS | TEMPERATURE: 97.8 F | SYSTOLIC BLOOD PRESSURE: 112 MMHG | DIASTOLIC BLOOD PRESSURE: 70 MMHG | HEIGHT: 72 IN | RESPIRATION RATE: 19 BRPM | BODY MASS INDEX: 30.48 KG/M2

## 2024-12-05 DIAGNOSIS — Z00.00 HEALTHCARE MAINTENANCE: Primary | ICD-10-CM

## 2024-12-05 DIAGNOSIS — E78.2 MIXED HYPERLIPIDEMIA: ICD-10-CM

## 2024-12-05 DIAGNOSIS — E03.9 ACQUIRED HYPOTHYROIDISM: ICD-10-CM

## 2024-12-05 DIAGNOSIS — I10 ESSENTIAL HYPERTENSION: ICD-10-CM

## 2024-12-05 DIAGNOSIS — H93.13 TINNITUS OF BOTH EARS: ICD-10-CM

## 2024-12-05 DIAGNOSIS — Z79.4 TYPE 2 DIABETES MELLITUS WITHOUT COMPLICATION, WITH LONG-TERM CURRENT USE OF INSULIN (MULTI): ICD-10-CM

## 2024-12-05 DIAGNOSIS — E11.9 TYPE 2 DIABETES MELLITUS WITHOUT COMPLICATION, WITH LONG-TERM CURRENT USE OF INSULIN (MULTI): ICD-10-CM

## 2024-12-05 PROCEDURE — 3008F BODY MASS INDEX DOCD: CPT | Performed by: FAMILY MEDICINE

## 2024-12-05 PROCEDURE — 3074F SYST BP LT 130 MM HG: CPT | Performed by: FAMILY MEDICINE

## 2024-12-05 PROCEDURE — 1036F TOBACCO NON-USER: CPT | Performed by: FAMILY MEDICINE

## 2024-12-05 PROCEDURE — 4010F ACE/ARB THERAPY RXD/TAKEN: CPT | Performed by: FAMILY MEDICINE

## 2024-12-05 PROCEDURE — 3078F DIAST BP <80 MM HG: CPT | Performed by: FAMILY MEDICINE

## 2024-12-05 PROCEDURE — 3044F HG A1C LEVEL LT 7.0%: CPT | Performed by: FAMILY MEDICINE

## 2024-12-05 PROCEDURE — 99396 PREV VISIT EST AGE 40-64: CPT | Performed by: FAMILY MEDICINE

## 2024-12-05 RX ORDER — LISINOPRIL 20 MG/1
TABLET ORAL
Qty: 90 TABLET | Refills: 1 | Status: SHIPPED | OUTPATIENT
Start: 2024-12-05

## 2024-12-05 RX ORDER — LEVOTHYROXINE SODIUM 137 UG/1
137 TABLET ORAL DAILY
Qty: 90 TABLET | Refills: 1 | Status: SHIPPED | OUTPATIENT
Start: 2024-12-05

## 2024-12-05 RX ORDER — METFORMIN HYDROCHLORIDE 1000 MG/1
1000 TABLET ORAL
Qty: 180 TABLET | Refills: 1 | Status: SHIPPED | OUTPATIENT
Start: 2024-12-05

## 2024-12-05 RX ORDER — DULAGLUTIDE 3 MG/.5ML
3 INJECTION, SOLUTION SUBCUTANEOUS
Qty: 6 ML | Refills: 1 | Status: SHIPPED | OUTPATIENT
Start: 2024-12-05

## 2024-12-05 RX ORDER — LEVOTHYROXINE SODIUM 150 UG/1
TABLET ORAL
Qty: 90 TABLET | Refills: 1 | Status: CANCELLED | OUTPATIENT
Start: 2024-12-05

## 2024-12-05 RX ORDER — ATORVASTATIN CALCIUM 20 MG/1
TABLET, FILM COATED ORAL
Qty: 90 TABLET | Refills: 1 | Status: SHIPPED | OUTPATIENT
Start: 2024-12-05

## 2024-12-05 ASSESSMENT — ENCOUNTER SYMPTOMS
POLYDIPSIA: 0
SORE THROAT: 0
VOMITING: 0
DIARRHEA: 0
FEVER: 0
BLURRED VISION: 0
HEADACHES: 0
RHINORRHEA: 0
WEAKNESS: 0
NUMBNESS: 0
PHOTOPHOBIA: 0
POLYPHAGIA: 0
ADENOPATHY: 0
TREMORS: 0
FREQUENCY: 0
FATIGUE: 0
DYSURIA: 0
PALPITATIONS: 1
COUGH: 0
VISUAL CHANGE: 0
BRUISES/BLEEDS EASILY: 0
CHILLS: 0
CONSTIPATION: 0
SHORTNESS OF BREATH: 0
HEMATURIA: 0
DIZZINESS: 0
WHEEZING: 0
ABDOMINAL PAIN: 0

## 2024-12-05 NOTE — ASSESSMENT & PLAN NOTE
We will have him start taking OTC Lipo Flavonoid. Follow-up if persistent or worsening symptoms otherwise when necessary.

## 2024-12-05 NOTE — PROGRESS NOTES
Subjective   Patient ID: Jesus Cook is a 54 y.o. male who presents for Annual Exam, Follow-up (Diabetes, Hypertension, Hyperlipidemia, Hypothyroidism and labs), and Tinnitus.    Patient is here for annual physical exam and follow-up on hypertension and hyperlipidemia. He has no chest pain, dyspnea, exertional chest pressure/discomfort, near-syncope, orthopnea, paroxysmal nocturnal dyspnea, and syncope. Taking his medication regularly with no side effects.    He presents for follow up of hypothyroidism. Current symptoms: palpitations. He has no change in energy level, diarrhea, heat / cold intolerance, nervousness, or weight changes.     He complains of tinnitus. The ringing is heard in both ears. He has no dizziness.     Diabetes  He presents for his follow-up diabetic visit. He has type 2 diabetes mellitus. There are no hypoglycemic associated symptoms. Pertinent negatives for hypoglycemia include no dizziness, headaches or tremors. Pertinent negatives for diabetes include no blurred vision, no chest pain, no fatigue, no foot paresthesias, no foot ulcerations, no polydipsia, no polyphagia, no polyuria, no visual change and no weakness. There are no diabetic complications. Pertinent negatives for diabetic complications include no CVA, nephropathy, peripheral neuropathy, PVD or retinopathy. Risk factors for coronary artery disease include diabetes mellitus, dyslipidemia, hypertension, male sex and obesity. He is following a diabetic and low fat/cholesterol diet. His home blood glucose trend is decreasing rapidly.        Review of Systems   Constitutional:  Negative for chills, fatigue and fever.   HENT:  Negative for congestion, ear pain, nosebleeds, rhinorrhea and sore throat.    Eyes:  Negative for blurred vision, photophobia and visual disturbance.   Respiratory:  Negative for cough, shortness of breath and wheezing.    Cardiovascular:  Positive for palpitations. Negative for chest pain and leg swelling.    Gastrointestinal:  Negative for abdominal pain, constipation, diarrhea and vomiting.   Endocrine: Negative for polydipsia, polyphagia and polyuria.   Genitourinary:  Negative for dysuria, frequency and hematuria.   Neurological:  Negative for dizziness, tremors, weakness, numbness and headaches.   Hematological:  Negative for adenopathy. Does not bruise/bleed easily.       Objective   /70   Pulse 65   Temp 36.6 °C (97.8 °F)   Resp 19   Ht 1.829 m (6')   Wt 102 kg (225 lb)   SpO2 98%   BMI 30.52 kg/m²     Physical Exam  Constitutional:       General: He is not in acute distress.     Appearance: Normal appearance.   HENT:      Head: Normocephalic and atraumatic.      Mouth/Throat:      Mouth: Mucous membranes are moist.      Pharynx: Oropharynx is clear. No oropharyngeal exudate or posterior oropharyngeal erythema.   Eyes:      General: No scleral icterus.     Extraocular Movements: Extraocular movements intact.      Pupils: Pupils are equal, round, and reactive to light.   Cardiovascular:      Rate and Rhythm: Normal rate and regular rhythm.      Pulses: Normal pulses.      Heart sounds: No murmur heard.     No friction rub. No gallop.   Pulmonary:      Effort: Pulmonary effort is normal.      Breath sounds: No wheezing, rhonchi or rales.   Musculoskeletal:      Right lower leg: No edema.      Left lower leg: No edema.   Skin:     General: Skin is warm.      Coloration: Skin is not jaundiced or pale.      Findings: No erythema or rash.   Neurological:      General: No focal deficit present.      Mental Status: He is alert and oriented to person, place, and time.      Cranial Nerves: No cranial nerve deficit.      Sensory: No sensory deficit.      Motor: No weakness.      Coordination: Coordination normal.      Gait: Gait normal.         Abstract on 12/04/2024   Component Date Value Ref Range Status    Hemoglobin 12/02/2024 14.4  13.5 - 17.5 g/dL Final    Hematocrit 12/02/2024 43.9  41.0 - 52.0 % Final     Platelets 12/02/2024 352  150 - 450 x10*3/uL Final    WBC 12/02/2024 8.3  4.4 - 11.3 x10*3/uL Final    Glucose 12/02/2024 107  mg/dL Final    Urea Nitrogen 12/02/2024 10  mg/dL Final    Creatinine 12/02/2024 0.94  mg/dL Final    Potassium 12/02/2024 4.6  mmol/L Final    Sodium 12/02/2024 138  mmol/L Final    Triglycerides 12/02/2024 113  mg/dL Final    Cholesterol 12/02/2024 116  mg/dL Final    HDL-Cholesterol 12/02/2024 39.0  mg/dL Final    LDL Cholesterol 12/02/2024 57  mg/dL Final    Alkaline Phosphatase 12/02/2024 76  U/L Final    ALT 12/02/2024 17  U/L Final    AST 12/02/2024 15  U/L Final    Bilirubin, Total 12/02/2024 1.0  0.0 - 1.2 mg/dL Final    Hemoglobin A1C 12/02/2024 6.4  % Final    Thyroid Stimulating Hormone 12/02/2024 0.37  mIU/L Final     Assessment/Plan   Problem List Items Addressed This Visit       Acquired hypothyroidism     We will decrease the Levothyroxine to 137 mcg daily and recheck his thyroid function in 2-3 months.         Relevant Medications    levothyroxine (Synthroid, Levoxyl) 137 mcg tablet    Other Relevant Orders    Thyroid Stimulating Hormone    Thyroxine, Free    Follow Up In [facility] - PCP - Established    Thyroid Stimulating Hormone    Thyroxine, Free    Essential hypertension     Well-controlled, continue current medications and management.         Relevant Medications    lisinopril 20 mg tablet    Other Relevant Orders    CBC and Auto Differential    Comprehensive Metabolic Panel    Hemoglobin A1C    Lipid Panel    Follow Up In [facility] - PCP - Established    Healthcare maintenance - Primary     Recommend low-cholesterol diet, low-fat diet and low-salt diet.  The need for lifelong dietary compliance in order to reduce cardiac risk is recommended.  We will also recommend regular exercise program to improve lipid balance and overall health.  Recommend decreasing fat and cholesterol in diet, increasing aerobic exercise with a goal of 4 or more days  per week         Mixed hyperlipidemia     The nature of cardiac risk has been fully discussed with this patient. Discussed cardiovascular risk analysis and appropriate diet with the need for lifelong measures to reduce the risk. A regular exercise program is recommended to help achieve and maintain normal body weight, fitness and improve lipid balance. Patient education provided. They understand and agree with this course of treatment. They will return with new or worsening symptoms. Patient instructed to remain current with appropriate annual health maintenance.          Relevant Medications    atorvastatin (Lipitor) 20 mg tablet    Other Relevant Orders    CBC and Auto Differential    Comprehensive Metabolic Panel    Hemoglobin A1C    Lipid Panel    Follow Up In [facility] - PCP - Established    Tinnitus of both ears     We will have him start taking OTC Lipo Flavonoid. Follow-up if persistent or worsening symptoms otherwise when necessary.         Type 2 diabetes mellitus without complication, with long-term current use of insulin (Multi)     Diabetes Mellitus type II, under good control.  1. Rx changes: None  2. Education: Reviewed ‘ABCs’ of diabetes management (respective goals in parentheses):  A1C (<7), blood pressure (<130/80), and cholesterol (LDL <100).  3. Compliance at present is estimated to be good. Efforts to improve compliance (if necessary) will be directed at dietary modifications: and increased exercise.  4. Follow up: 5 months         Relevant Medications    metFORMIN (Glucophage) 1,000 mg tablet    dulaglutide (Trulicity) 3 mg/0.5 mL pen injector    Other Relevant Orders    CBC and Auto Differential    Comprehensive Metabolic Panel    Hemoglobin A1C    Lipid Panel    Follow Up In [facility] - PCP - Established     Scribe Attestation  By signing my name below, IShorty Scribe   attest that this documentation has been prepared under the direction and in the  presence of Damion Sutton MD.

## 2024-12-05 NOTE — ASSESSMENT & PLAN NOTE
We will decrease the Levothyroxine to 137 mcg daily and recheck his thyroid function in 2-3 months.

## 2025-05-08 ENCOUNTER — APPOINTMENT (OUTPATIENT)
Dept: PRIMARY CARE | Facility: CLINIC | Age: 55
End: 2025-05-08
Payer: COMMERCIAL

## 2025-05-31 DIAGNOSIS — I10 ESSENTIAL HYPERTENSION: ICD-10-CM

## 2025-05-31 RX ORDER — LISINOPRIL 20 MG/1
20 TABLET ORAL DAILY
Qty: 90 TABLET | Refills: 0 | OUTPATIENT
Start: 2025-05-31

## 2025-06-02 DIAGNOSIS — E78.2 MIXED HYPERLIPIDEMIA: ICD-10-CM

## 2025-06-02 DIAGNOSIS — Z79.4 TYPE 2 DIABETES MELLITUS WITHOUT COMPLICATION, WITH LONG-TERM CURRENT USE OF INSULIN: ICD-10-CM

## 2025-06-02 DIAGNOSIS — I10 ESSENTIAL HYPERTENSION: ICD-10-CM

## 2025-06-02 DIAGNOSIS — E11.9 TYPE 2 DIABETES MELLITUS WITHOUT COMPLICATION, WITH LONG-TERM CURRENT USE OF INSULIN: ICD-10-CM

## 2025-06-02 DIAGNOSIS — E03.9 ACQUIRED HYPOTHYROIDISM: ICD-10-CM

## 2025-06-02 RX ORDER — ATORVASTATIN CALCIUM 20 MG/1
TABLET, FILM COATED ORAL
Qty: 30 TABLET | Refills: 0 | Status: SHIPPED | OUTPATIENT
Start: 2025-06-02

## 2025-06-02 RX ORDER — LISINOPRIL 20 MG/1
TABLET ORAL
Qty: 30 TABLET | Refills: 0 | Status: SHIPPED | OUTPATIENT
Start: 2025-06-02

## 2025-06-02 RX ORDER — LEVOTHYROXINE SODIUM 137 UG/1
137 TABLET ORAL DAILY
Qty: 30 TABLET | Refills: 0 | Status: SHIPPED | OUTPATIENT
Start: 2025-06-02

## 2025-06-02 RX ORDER — METFORMIN HYDROCHLORIDE 1000 MG/1
1000 TABLET ORAL
Qty: 30 TABLET | Refills: 0 | Status: SHIPPED | OUTPATIENT
Start: 2025-06-02

## 2025-06-02 NOTE — TELEPHONE ENCOUNTER
Please advise of fill before appt. On 6/27/25. Pended #30 for each refill. Please advise.     Metformin-5/27/25  #30  Lisinopril 5/7/25  #30  Levothyroxine 3/18/25 #90  Atorvastatin 4/28/25 #90

## 2025-06-02 NOTE — TELEPHONE ENCOUNTER
Rx Refill Request Telephone Encounter    Name:  Jesus Cook  :  [id number]  Medication Name:    metFORMIN (Glucophage) 1,000 mg tablet   lisinopril 20 mg tablet   levothyroxine (Synthroid, Levoxyl) 137 mcg tablet   atorvastatin (Lipitor) 20 mg tablet     Specific Pharmacy location:  [facility]  Date of last appointment:  24  Date of next appointment:  25  Best number to reach patient:  [phone]

## 2025-06-06 ENCOUNTER — APPOINTMENT (OUTPATIENT)
Dept: PRIMARY CARE | Facility: CLINIC | Age: 55
End: 2025-06-06
Payer: COMMERCIAL

## 2025-06-23 LAB
ALANINE AMINOTRANSFERASE (SGPT) (U/L) IN SER/PLAS: 18 U/L
ALKALINE PHOSPHATASE (U/L) IN SER/PLAS: 84 U/L
ASPARTATE AMINOTRANSFERASE (SGOT) (U/L) IN SER/PLAS: 21 U/L
BILIRUBIN, TOTAL  (MG/DL) IN SER/PLAS: 0.9 MG/DL (ref 0–1.2)
CHOLESTEROL (MG/DL) IN SER/PLAS: 129 MG/DL
CREATININE (MG/DL) IN SER/PLAS: 0.94 MG/DL
GLUCOSE: 113 MG/DL
HDL-CHOLESTEROL (MG/DL) IN SER/PLAS: 34 MG/DL
HEMATOCRIT (%) IN BLOOD BY AUTOMATED COUNT: 44.4 % (ref 41–52)
HEMOGLOBIN (G/DL) IN BLOOD: 14.6 G/DL (ref 13.5–17.5)
HEMOGLOBIN A1C/HEMOGLOBIN TOTAL IN BLOOD: 7.2 %
LDL CHOLESTEROL: 72 MG/DL
LEUKOCYTES (10*3/UL) IN BLOOD BY AUTOMATED COUNT: 6.9 X10*3/UL (ref 4.4–11.3)
NEUTROPHILS (10*3/UL) IN BLOOD BY AUTOMATED COUNT: 3.8 X10*3/UL (ref 1.2–7.7)
PLATELETS (10*3/UL) IN BLOOD AUTOMATED COUNT: 321 X10*3/UL (ref 150–450)
POTASSIUM (MMOL/L) IN SER/PLAS: 4.6 MMOL/L
SODIUM (MMOL/L) IN SER/PLAS: 136 MMOL/L
THYROID STIMULATING HORMONE (MIU/L) IN SER/PLAS: 1.22 MIU/L
TRIGLYCERIDES  (MG/DL) IN SER/PLAS: 127 MG/DL
UREA NITROGEN (MG/DL) IN SER/PLAS: 13 MG/DL

## 2025-06-27 ENCOUNTER — APPOINTMENT (OUTPATIENT)
Dept: PRIMARY CARE | Facility: CLINIC | Age: 55
End: 2025-06-27
Payer: COMMERCIAL

## 2025-06-27 VITALS
SYSTOLIC BLOOD PRESSURE: 106 MMHG | HEIGHT: 72 IN | TEMPERATURE: 97.1 F | HEART RATE: 78 BPM | DIASTOLIC BLOOD PRESSURE: 78 MMHG | BODY MASS INDEX: 29.8 KG/M2 | WEIGHT: 220 LBS | RESPIRATION RATE: 18 BRPM | OXYGEN SATURATION: 97 %

## 2025-06-27 DIAGNOSIS — Z79.4 TYPE 2 DIABETES MELLITUS WITHOUT COMPLICATION, WITH LONG-TERM CURRENT USE OF INSULIN: Primary | ICD-10-CM

## 2025-06-27 DIAGNOSIS — I10 ESSENTIAL HYPERTENSION: ICD-10-CM

## 2025-06-27 DIAGNOSIS — E78.2 MIXED HYPERLIPIDEMIA: ICD-10-CM

## 2025-06-27 DIAGNOSIS — E03.9 ACQUIRED HYPOTHYROIDISM: ICD-10-CM

## 2025-06-27 DIAGNOSIS — E11.9 TYPE 2 DIABETES MELLITUS WITHOUT COMPLICATION, WITH LONG-TERM CURRENT USE OF INSULIN: Primary | ICD-10-CM

## 2025-06-27 DIAGNOSIS — Z12.5 ENCOUNTER FOR SCREENING FOR MALIGNANT NEOPLASM OF PROSTATE: ICD-10-CM

## 2025-06-27 PROCEDURE — 3051F HG A1C>EQUAL 7.0%<8.0%: CPT | Performed by: FAMILY MEDICINE

## 2025-06-27 PROCEDURE — 4010F ACE/ARB THERAPY RXD/TAKEN: CPT | Performed by: FAMILY MEDICINE

## 2025-06-27 PROCEDURE — 99214 OFFICE O/P EST MOD 30 MIN: CPT | Performed by: FAMILY MEDICINE

## 2025-06-27 PROCEDURE — 3074F SYST BP LT 130 MM HG: CPT | Performed by: FAMILY MEDICINE

## 2025-06-27 PROCEDURE — 3008F BODY MASS INDEX DOCD: CPT | Performed by: FAMILY MEDICINE

## 2025-06-27 PROCEDURE — 3078F DIAST BP <80 MM HG: CPT | Performed by: FAMILY MEDICINE

## 2025-06-27 RX ORDER — METFORMIN HYDROCHLORIDE 1000 MG/1
1000 TABLET ORAL
Qty: 30 TABLET | Refills: 0 | Status: CANCELLED | OUTPATIENT
Start: 2025-06-27

## 2025-06-27 RX ORDER — LEVOTHYROXINE SODIUM 137 UG/1
137 TABLET ORAL DAILY
Qty: 90 TABLET | Refills: 1 | Status: SHIPPED | OUTPATIENT
Start: 2025-06-27

## 2025-06-27 RX ORDER — DULAGLUTIDE 3 MG/.5ML
3 INJECTION, SOLUTION SUBCUTANEOUS
Qty: 6 ML | Refills: 1 | Status: SHIPPED | OUTPATIENT
Start: 2025-06-27

## 2025-06-27 RX ORDER — METFORMIN HYDROCHLORIDE 850 MG/1
850 TABLET ORAL
Qty: 180 TABLET | Refills: 1 | Status: SHIPPED | OUTPATIENT
Start: 2025-06-27

## 2025-06-27 RX ORDER — ATORVASTATIN CALCIUM 20 MG/1
20 TABLET, FILM COATED ORAL NIGHTLY
Qty: 90 TABLET | Refills: 1 | Status: SHIPPED | OUTPATIENT
Start: 2025-06-27

## 2025-06-27 RX ORDER — LISINOPRIL 20 MG/1
20 TABLET ORAL DAILY
Qty: 90 TABLET | Refills: 1 | Status: SHIPPED | OUTPATIENT
Start: 2025-06-27

## 2025-06-27 ASSESSMENT — ENCOUNTER SYMPTOMS
BLURRED VISION: 0
POLYPHAGIA: 0
WEAKNESS: 0
VISUAL CHANGE: 0
FATIGUE: 0
POLYDIPSIA: 0
WEIGHT LOSS: 0

## 2025-06-27 ASSESSMENT — PATIENT HEALTH QUESTIONNAIRE - PHQ9
SUM OF ALL RESPONSES TO PHQ9 QUESTIONS 1 AND 2: 0
2. FEELING DOWN, DEPRESSED OR HOPELESS: NOT AT ALL
1. LITTLE INTEREST OR PLEASURE IN DOING THINGS: NOT AT ALL

## 2025-06-27 NOTE — ASSESSMENT & PLAN NOTE
Diabetes Mellitus type II, under good control.  1. Rx changes: We will decrease the Metformin to 850 mg twice a day.  2. Education: Reviewed ‘ABCs’ of diabetes management (respective goals in parentheses):  A1C (<7), blood pressure (<130/80), and cholesterol (LDL <100).  3. Compliance at present is estimated to be good. Efforts to improve compliance (if necessary) will be directed at dietary modifications: and increased exercise.  4. Follow up: 5 months

## 2025-06-27 NOTE — PROGRESS NOTES
Subjective   Patient ID: Jesus Cook is a 55 y.o. male who presents for Follow-up (Diabetes, Hypertension, Hyperlipidemia, Hypothyroidism and labs).    Patient is here for follow-up on hypertension and hyperlipidemia. He has no chest pain, dyspnea, exertional chest pressure/discomfort, near-syncope, orthopnea, palpitations, paroxysmal nocturnal dyspnea, and syncope.     He presents for follow up of hypothyroidism. Current symptoms: none. He has no change in energy level, heat / cold intolerance, nervousness, palpitations, or weight changes.     Patient notes that he had a flare up of diverticulitis at the beginning of the month and was treated with a 10 day course of Flagyl and his symptoms have completely resolved.    Diabetes  He presents for his follow-up diabetic visit. He has type 2 diabetes mellitus. There are no hypoglycemic associated symptoms. Pertinent negatives for hypoglycemia include no dizziness, headaches or tremors. Pertinent negatives for diabetes include no blurred vision, no chest pain, no fatigue, no foot ulcerations, no polydipsia, no polyphagia, no polyuria, no visual change, no weakness and no weight loss. Risk factors for coronary artery disease include diabetes mellitus, dyslipidemia, hypertension and male sex.        Review of Systems   Constitutional:  Negative for chills, fatigue, fever and weight loss.   HENT:  Negative for congestion, ear pain, nosebleeds, rhinorrhea and sore throat.    Eyes:  Negative for blurred vision.   Respiratory:  Negative for cough, shortness of breath and wheezing.    Cardiovascular:  Negative for chest pain, palpitations and leg swelling.   Gastrointestinal:  Negative for abdominal pain, constipation, diarrhea and vomiting.   Endocrine: Negative for cold intolerance, heat intolerance, polydipsia, polyphagia and polyuria.   Genitourinary:  Negative for dysuria, frequency and hematuria.   Neurological:  Negative for dizziness, tremors, weakness, numbness and  headaches.   Hematological:  Negative for adenopathy. Does not bruise/bleed easily.       Objective   /78   Pulse 78   Temp 36.2 °C (97.1 °F)   Resp 18   Ht 1.829 m (6')   Wt 99.8 kg (220 lb)   SpO2 97%   BMI 29.84 kg/m²     Physical Exam  Constitutional:       General: He is not in acute distress.     Appearance: Normal appearance.   HENT:      Head: Normocephalic and atraumatic.      Mouth/Throat:      Mouth: Mucous membranes are moist.      Pharynx: Oropharynx is clear. No oropharyngeal exudate or posterior oropharyngeal erythema.   Eyes:      General: No scleral icterus.     Extraocular Movements: Extraocular movements intact.      Pupils: Pupils are equal, round, and reactive to light.   Cardiovascular:      Rate and Rhythm: Normal rate and regular rhythm.      Pulses: Normal pulses.      Heart sounds: No murmur heard.     No friction rub. No gallop.   Pulmonary:      Effort: Pulmonary effort is normal.      Breath sounds: No wheezing, rhonchi or rales.   Musculoskeletal:      Right lower leg: No edema.      Left lower leg: No edema.   Skin:     General: Skin is warm.      Coloration: Skin is not jaundiced or pale.      Findings: No erythema or rash.   Neurological:      General: No focal deficit present.      Mental Status: He is alert and oriented to person, place, and time.      Cranial Nerves: No cranial nerve deficit.      Sensory: No sensory deficit.      Coordination: Coordination normal.      Gait: Gait normal.         Abstract on 06/24/2025   Component Date Value Ref Range Status    Hemoglobin 06/23/2025 14.6  13.5 - 17.5 g/dL Final    Hematocrit 06/23/2025 44.4  41.0 - 52.0 % Final    Neutrophils Absolute 06/23/2025 3.80  1.20 - 7.70 x10*3/uL Final    Platelets 06/23/2025 321  150 - 450 x10*3/uL Final    WBC 06/23/2025 6.9  4.4 - 11.3 x10*3/uL Final    Glucose 06/23/2025 113  mg/dL Final    Urea Nitrogen 06/23/2025 13  mg/dL Final    Creatinine 06/23/2025 0.94  mg/dL Final    Potassium  06/23/2025 4.6  mmol/L Final    Sodium 06/23/2025 136  mmol/L Final    Triglycerides 06/23/2025 127  mg/dL Final    Cholesterol 06/23/2025 129  mg/dL Final    HDL-Cholesterol 06/23/2025 34.0  mg/dL Final    LDL Cholesterol 06/23/2025 72  mg/dL Final    Alkaline Phosphatase 06/23/2025 84  U/L Final    ALT 06/23/2025 18  U/L Final    AST 06/23/2025 21  U/L Final    Bilirubin, Total 06/23/2025 0.9  0.0 - 1.2 mg/dL Final    Hemoglobin A1C 06/23/2025 7.2  % Final    Thyroid Stimulating Hormone 06/23/2025 1.22  mIU/L Final       Assessment/Plan   Problem List Items Addressed This Visit       Acquired hypothyroidism    Well-controlled, continue current medications and management.           Relevant Medications    levothyroxine (Synthroid, Levoxyl) 137 mcg tablet    Other Relevant Orders    Thyroxine, Free    Thyroid Stimulating Hormone    Follow Up In [facility] - PCP    Essential hypertension    Well-controlled, continue current medications and management.         Relevant Medications    lisinopril 20 mg tablet    Other Relevant Orders    CBC and Auto Differential    Comprehensive Metabolic Panel    Hemoglobin A1C    Lipid Panel    Follow Up In [facility] - PCP    Mixed hyperlipidemia    The nature of cardiac risk has been fully discussed with this patient. Discussed cardiovascular risk analysis and appropriate diet with the need for lifelong measures to reduce the risk. A regular exercise program is recommended to help achieve and maintain normal body weight, fitness and improve lipid balance. Patient education provided. They understand and agree with this course of treatment. They will return with new or worsening symptoms. Patient instructed to remain current with appropriate annual health maintenance.          Relevant Medications    atorvastatin (Lipitor) 20 mg tablet    Other Relevant Orders    CBC and Auto Differential    Comprehensive Metabolic Panel    Hemoglobin A1C    Lipid Panel    Follow  Up In [facility] - PCP    Type 2 diabetes mellitus without complication, with long-term current use of insulin - Primary    Diabetes Mellitus type II, under good control.  1. Rx changes: We will decrease the Metformin to 850 mg twice a day.  2. Education: Reviewed ‘ABCs’ of diabetes management (respective goals in parentheses):  A1C (<7), blood pressure (<130/80), and cholesterol (LDL <100).  3. Compliance at present is estimated to be good. Efforts to improve compliance (if necessary) will be directed at dietary modifications: and increased exercise.  4. Follow up: 5 months         Relevant Medications    dulaglutide (Trulicity) 3 mg/0.5 mL injection    metFORMIN (Glucophage) 850 mg tablet    Other Relevant Orders    CBC and Auto Differential    Comprehensive Metabolic Panel    Hemoglobin A1C    Lipid Panel    Follow Up In [facility] - PCP     Other Visit Diagnoses         Encounter for screening for malignant neoplasm of prostate        Relevant Orders    Prostate Specific Antigen, Screen          Scribe Attestation  By signing my name below, IShorty Scribe   attest that this documentation has been prepared under the direction and in the presence of Damion Sutton MD.

## 2025-06-28 ASSESSMENT — ENCOUNTER SYMPTOMS
BRUISES/BLEEDS EASILY: 0
CHILLS: 0
TREMORS: 0
HEADACHES: 0
DIARRHEA: 0
DIZZINESS: 0
WHEEZING: 0
PALPITATIONS: 0
CONSTIPATION: 0
ADENOPATHY: 0
HEMATURIA: 0
DYSURIA: 0
RHINORRHEA: 0
FREQUENCY: 0
NUMBNESS: 0
SORE THROAT: 0
COUGH: 0
ABDOMINAL PAIN: 0
FEVER: 0
SHORTNESS OF BREATH: 0
VOMITING: 0